# Patient Record
Sex: MALE | Race: BLACK OR AFRICAN AMERICAN | NOT HISPANIC OR LATINO | Employment: FULL TIME | URBAN - NONMETROPOLITAN AREA
[De-identification: names, ages, dates, MRNs, and addresses within clinical notes are randomized per-mention and may not be internally consistent; named-entity substitution may affect disease eponyms.]

---

## 2023-04-02 DIAGNOSIS — I10 BENIGN ESSENTIAL HTN: Primary | ICD-10-CM

## 2023-04-03 RX ORDER — LISINOPRIL 40 MG/1
TABLET ORAL
Qty: 90 TABLET | Refills: 1 | Status: SHIPPED | OUTPATIENT
Start: 2023-04-03 | End: 2023-10-09

## 2023-04-03 RX ORDER — HYDROCHLOROTHIAZIDE 12.5 MG/1
TABLET ORAL
Qty: 90 TABLET | Refills: 1 | Status: SHIPPED | OUTPATIENT
Start: 2023-04-03 | End: 2023-06-23 | Stop reason: SINTOL

## 2023-04-17 ENCOUNTER — TELEPHONE (OUTPATIENT)
Dept: PRIMARY CARE | Facility: CLINIC | Age: 63
End: 2023-04-17
Payer: COMMERCIAL

## 2023-04-17 NOTE — TELEPHONE ENCOUNTER
I received a reminder call about an appointment that I have tomorrow but the message got deleted and I do not have any idea where I am going.  Do you know?  I will be home the rest of the evening.

## 2023-04-18 LAB
APPEARANCE, URINE: CLEAR
BILIRUBIN, URINE: NEGATIVE
BLOOD, URINE: ABNORMAL
COLOR, URINE: YELLOW
GLUCOSE, URINE: NEGATIVE MG/DL
KETONES, URINE: NEGATIVE MG/DL
LEUKOCYTE ESTERASE, URINE: NEGATIVE
NITRITE, URINE: NEGATIVE
PH, URINE: 5 (ref 5–8)
PROTEIN, URINE: ABNORMAL MG/DL
RBC, URINE: NORMAL /HPF (ref 0–5)
SPECIFIC GRAVITY, URINE: 1.02 (ref 1–1.03)
SQUAMOUS EPITHELIAL CELLS, URINE: <1 /HPF
UROBILINOGEN, URINE: <2 MG/DL (ref 0–1.9)
WBC, URINE: 1 /HPF (ref 0–5)

## 2023-04-19 LAB
ALBUMIN (MG/L) IN URINE: 99.1 MG/L
ALBUMIN/CREATININE (UG/MG) IN URINE: 50.6 UG/MG CRT (ref 0–30)
CHLORIDE (MOL/L) IN SPOT URINE: 90 MMOL/L
CHLORIDE/CREATININE (MMOL/G) IN URINE: 46 MMOL/G CREAT (ref 23–275)
CREATININE (MG/DL) IN URINE: 196 MG/DL (ref 20–370)
OSMOLALITY, RANDOM URINE: 771 MOSM/KG (ref 200–1200)
POTASSIUM URINE RANDOM: 106 MMOL/L
POTASSIUM/CREATININE (MMOL/G) IN URINE: 54 MMOL/G CREAT
PROTEIN (MG/DL) IN URINE: 101 MG/DL (ref 5–25)
PROTEIN/CREATININE (MG/MG) IN URINE: 0.52 MG/MG CREAT (ref 0–0.17)
SODIUM URINE RANDOM: 56 MMOL/L
SODIUM/CREATININE (MMOL/G) IN URINE: 29 MMOL/G CREAT
UREA NITROGEN, RANDOM URINE: 1148 MG/DL
UREA NITROGEN/CREATININE (G/G) URINE: 5.9 G/G CREAT

## 2023-06-23 ENCOUNTER — OFFICE VISIT (OUTPATIENT)
Dept: PRIMARY CARE | Facility: CLINIC | Age: 63
End: 2023-06-23
Payer: COMMERCIAL

## 2023-06-23 VITALS
WEIGHT: 227 LBS | SYSTOLIC BLOOD PRESSURE: 126 MMHG | HEIGHT: 69 IN | BODY MASS INDEX: 33.62 KG/M2 | OXYGEN SATURATION: 99 % | DIASTOLIC BLOOD PRESSURE: 70 MMHG | HEART RATE: 68 BPM

## 2023-06-23 DIAGNOSIS — J45.20 MILD INTERMITTENT ASTHMA WITHOUT COMPLICATION (HHS-HCC): ICD-10-CM

## 2023-06-23 DIAGNOSIS — N18.32 CHRONIC RENAL IMPAIRMENT, STAGE 3B (MULTI): ICD-10-CM

## 2023-06-23 DIAGNOSIS — E78.5 DYSLIPIDEMIA: ICD-10-CM

## 2023-06-23 DIAGNOSIS — I10 BENIGN ESSENTIAL HYPERTENSION: ICD-10-CM

## 2023-06-23 DIAGNOSIS — Z00.00 WELLNESS EXAMINATION: ICD-10-CM

## 2023-06-23 DIAGNOSIS — Z23 IMMUNIZATION DUE: Primary | ICD-10-CM

## 2023-06-23 PROBLEM — R17 ELEVATED BILIRUBIN: Status: ACTIVE | Noted: 2023-06-23

## 2023-06-23 PROBLEM — F98.8 ATTENTION DEFICIT DISORDER PREDOMINANT INATTENTIVE TYPE: Status: ACTIVE | Noted: 2023-06-23

## 2023-06-23 PROBLEM — R79.82 CRP ELEVATED: Status: ACTIVE | Noted: 2023-06-23

## 2023-06-23 PROBLEM — G47.33 OBSTRUCTIVE SLEEP APNEA: Status: ACTIVE | Noted: 2023-06-23

## 2023-06-23 PROBLEM — N28.9 IMPAIRED RENAL FUNCTION: Status: ACTIVE | Noted: 2023-06-23

## 2023-06-23 PROBLEM — E78.00 ELEVATED LDL CHOLESTEROL LEVEL: Status: ACTIVE | Noted: 2023-06-23

## 2023-06-23 PROBLEM — J30.9 ALLERGIC RHINITIS: Status: ACTIVE | Noted: 2023-06-23

## 2023-06-23 LAB
ALBUMIN (G/DL) IN SER/PLAS: 4.4 G/DL (ref 3.4–5)
ANION GAP IN SER/PLAS: 14 MMOL/L (ref 10–20)
APPEARANCE, URINE: CLEAR
APPEARANCE, URINE: NORMAL
ASCORBIC ACID: NORMAL MG/DL
BASOPHILS (10*3/UL) IN BLOOD BY AUTOMATED COUNT: 0.02 X10E9/L (ref 0–0.1)
BASOPHILS/100 LEUKOCYTES IN BLOOD BY AUTOMATED COUNT: 0.5 % (ref 0–2)
BILIRUBIN, URINE: NEGATIVE
BILIRUBIN, URINE: NORMAL
BLOOD, URINE: NEGATIVE
BLOOD, URINE: NORMAL
CALCIUM (MG/DL) IN SER/PLAS: 9.2 MG/DL (ref 8.6–10.3)
CARBON DIOXIDE, TOTAL (MMOL/L) IN SER/PLAS: 27 MMOL/L (ref 21–32)
CHLORIDE (MMOL/L) IN SER/PLAS: 100 MMOL/L (ref 98–107)
COLOR, URINE: NORMAL
COLOR, URINE: YELLOW
CREATININE (MG/DL) IN SER/PLAS: 1.79 MG/DL (ref 0.5–1.3)
EOSINOPHILS (10*3/UL) IN BLOOD BY AUTOMATED COUNT: 0.3 X10E9/L (ref 0–0.7)
EOSINOPHILS/100 LEUKOCYTES IN BLOOD BY AUTOMATED COUNT: 7.2 % (ref 0–6)
ERYTHROCYTE DISTRIBUTION WIDTH (RATIO) BY AUTOMATED COUNT: 11.6 % (ref 11.5–14.5)
ERYTHROCYTE MEAN CORPUSCULAR HEMOGLOBIN CONCENTRATION (G/DL) BY AUTOMATED: 31.5 G/DL (ref 32–36)
ERYTHROCYTE MEAN CORPUSCULAR VOLUME (FL) BY AUTOMATED COUNT: 97 FL (ref 80–100)
ERYTHROCYTES (10*6/UL) IN BLOOD BY AUTOMATED COUNT: 4.39 X10E12/L (ref 4.5–5.9)
FERRITIN (UG/LL) IN SER/PLAS: 210 UG/L (ref 20–300)
GFR MALE: 42 ML/MIN/1.73M2
GLUCOSE (MG/DL) IN SER/PLAS: 92 MG/DL (ref 74–99)
GLUCOSE, URINE: ABNORMAL MG/DL
GLUCOSE, URINE: NORMAL
HEMATOCRIT (%) IN BLOOD BY AUTOMATED COUNT: 42.5 % (ref 41–52)
HEMOGLOBIN (G/DL) IN BLOOD: 13.4 G/DL (ref 13.5–17.5)
IMMATURE GRANULOCYTES/100 LEUKOCYTES IN BLOOD BY AUTOMATED COUNT: 0 % (ref 0–0.9)
IRON (UG/DL) IN SER/PLAS: 79 UG/DL (ref 35–150)
IRON BINDING CAPACITY (UG/DL) IN SER/PLAS: 313 UG/DL (ref 240–445)
IRON SATURATION (%) IN SER/PLAS: 25 % (ref 25–45)
KETONES, URINE: NEGATIVE MG/DL
KETONES, URINE: NORMAL
LEUKOCYTE ESTERASE, URINE: NEGATIVE
LEUKOCYTE ESTERASE, URINE: NORMAL
LEUKOCYTES (10*3/UL) IN BLOOD BY AUTOMATED COUNT: 4.2 X10E9/L (ref 4.4–11.3)
LYMPHOCYTES (10*3/UL) IN BLOOD BY AUTOMATED COUNT: 1.74 X10E9/L (ref 1.2–4.8)
LYMPHOCYTES/100 LEUKOCYTES IN BLOOD BY AUTOMATED COUNT: 41.6 % (ref 13–44)
MONOCYTES (10*3/UL) IN BLOOD BY AUTOMATED COUNT: 0.28 X10E9/L (ref 0.1–1)
MONOCYTES/100 LEUKOCYTES IN BLOOD BY AUTOMATED COUNT: 6.7 % (ref 2–10)
NEUTROPHILS (10*3/UL) IN BLOOD BY AUTOMATED COUNT: 1.84 X10E9/L (ref 1.2–7.7)
NEUTROPHILS/100 LEUKOCYTES IN BLOOD BY AUTOMATED COUNT: 44 % (ref 40–80)
NITRITE, URINE: NEGATIVE
NITRITE, URINE: NORMAL
PARATHYRIN INTACT (PG/ML) IN SER/PLAS: 86.4 PG/ML (ref 18.5–88)
PH, URINE: 6 (ref 5–8)
PH, URINE: NORMAL
PHOSPHATE (MG/DL) IN SER/PLAS: 3.9 MG/DL (ref 2.5–4.9)
PLATELETS (10*3/UL) IN BLOOD AUTOMATED COUNT: 260 X10E9/L (ref 150–450)
POTASSIUM (MMOL/L) IN SER/PLAS: 4.3 MMOL/L (ref 3.5–5.3)
PROTEIN, URINE: NEGATIVE MG/DL
PROTEIN, URINE: NORMAL
SODIUM (MMOL/L) IN SER/PLAS: 137 MMOL/L (ref 136–145)
SPECIFIC GRAVITY, URINE: 1.01 (ref 1–1.03)
SPECIFIC GRAVITY, URINE: NORMAL
URATE (MG/DL) IN SER/PLAS: 3.9 MG/DL (ref 4–7.5)
UREA NITROGEN (MG/DL) IN SER/PLAS: 24 MG/DL (ref 6–23)
UROBILINOGEN, URINE: <2 MG/DL (ref 0–1.9)
UROBILINOGEN, URINE: NORMAL

## 2023-06-23 PROCEDURE — 85025 COMPLETE CBC W/AUTO DIFF WBC: CPT

## 2023-06-23 PROCEDURE — 90471 IMMUNIZATION ADMIN: CPT | Performed by: FAMILY MEDICINE

## 2023-06-23 PROCEDURE — 99396 PREV VISIT EST AGE 40-64: CPT | Performed by: FAMILY MEDICINE

## 2023-06-23 PROCEDURE — 1036F TOBACCO NON-USER: CPT | Performed by: FAMILY MEDICINE

## 2023-06-23 PROCEDURE — 81003 URINALYSIS AUTO W/O SCOPE: CPT

## 2023-06-23 PROCEDURE — 83550 IRON BINDING TEST: CPT

## 2023-06-23 PROCEDURE — 99214 OFFICE O/P EST MOD 30 MIN: CPT | Performed by: FAMILY MEDICINE

## 2023-06-23 PROCEDURE — 3078F DIAST BP <80 MM HG: CPT | Performed by: FAMILY MEDICINE

## 2023-06-23 PROCEDURE — 82570 ASSAY OF URINE CREATININE: CPT

## 2023-06-23 PROCEDURE — 82728 ASSAY OF FERRITIN: CPT

## 2023-06-23 PROCEDURE — 80069 RENAL FUNCTION PANEL: CPT

## 2023-06-23 PROCEDURE — 82610 CYSTATIN C: CPT

## 2023-06-23 PROCEDURE — 90677 PCV20 VACCINE IM: CPT | Performed by: FAMILY MEDICINE

## 2023-06-23 PROCEDURE — 83540 ASSAY OF IRON: CPT

## 2023-06-23 PROCEDURE — 83036 HEMOGLOBIN GLYCOSYLATED A1C: CPT

## 2023-06-23 PROCEDURE — 83970 ASSAY OF PARATHORMONE: CPT

## 2023-06-23 PROCEDURE — 82306 VITAMIN D 25 HYDROXY: CPT

## 2023-06-23 PROCEDURE — 3074F SYST BP LT 130 MM HG: CPT | Performed by: FAMILY MEDICINE

## 2023-06-23 PROCEDURE — 84550 ASSAY OF BLOOD/URIC ACID: CPT

## 2023-06-23 PROCEDURE — 82043 UR ALBUMIN QUANTITATIVE: CPT

## 2023-06-23 RX ORDER — ROSUVASTATIN CALCIUM 20 MG/1
20 TABLET, COATED ORAL DAILY
COMMUNITY
End: 2023-08-12

## 2023-06-23 RX ORDER — AMLODIPINE BESYLATE 5 MG/1
5 TABLET ORAL DAILY
COMMUNITY
End: 2023-08-12

## 2023-06-23 RX ORDER — LISINOPRIL AND HYDROCHLOROTHIAZIDE 12.5; 2 MG/1; MG/1
2 TABLET ORAL DAILY
COMMUNITY
End: 2023-06-23 | Stop reason: SINTOL

## 2023-06-23 RX ORDER — TRIAMCINOLONE ACETONIDE 55 UG/1
SPRAY, METERED NASAL
COMMUNITY
Start: 2019-04-06

## 2023-06-23 RX ORDER — METOPROLOL SUCCINATE 100 MG/1
TABLET, EXTENDED RELEASE ORAL
COMMUNITY
End: 2023-08-12

## 2023-06-23 RX ORDER — DAPAGLIFLOZIN 10 MG/1
1 TABLET, FILM COATED ORAL
COMMUNITY
Start: 2023-04-18

## 2023-06-23 RX ORDER — ALBUTEROL SULFATE 90 UG/1
2 AEROSOL, METERED RESPIRATORY (INHALATION) EVERY 4 HOURS PRN
COMMUNITY
Start: 2013-06-05 | End: 2023-12-29 | Stop reason: SDUPTHER

## 2023-06-23 NOTE — PATIENT INSTRUCTIONS
Stop hydrochlorthiazide - the water pill  -   DR Franz wanted you to do this AND it may be keeping you up at night causing you to urinate more   MAKE SURE YOU HAVE THE APPT SET UP WITH HER       Check with your insurance on the Shingrix vaccine-  see if they cover it and where they want you to get it.   If our office - you can make a nurse appt for it -  let me know and I will place the order after you have an appt.   If the pharmacy - then check with the pharmacy to see if you need an appointment.    Its two injections -  2 - 6 months apart.         If you get a bad cut - make an appt to come in for a tetnas booster         WELL VISIT/PREVENTATIVE VISIT INSTRUCTIONS:        Call [phone] to schedule any testing ordered at [facility].      For a mammogram, call   [phone] .    Please work on healthy nutrition,  optimal sleep, and regular exercise to feel better.    If you smoke - please quit, and if you drink alcohol, please limit your intake.     Generally speaking,  good nutrition consists of lean meats, like chicken, fish and turkey (not fried);    plenty of fruits and vegetables (the fresher the better);   Less sugars, saturated fats, and processed foods - especially those made with white flour.   Try to eat the majority of your grain foods  as whole grains.   Keep an eye on your total calories in a day and serving sizes on packaging - this will help you limit your overall intake.    Remember, to lose weight (if you need to), your total calorie intake has to be about 300 - 500 calories less than what you burn in a day.   That number depends on your age, gender and body size.   Some people find a fitbit (calorie tracking device) helpful.      Please be sure to see the dentist every 6 months.    Please see the eye doctor no longer than every 2 years.    When you have your Living Will and Medical Power of  papers completed   (they have to be witnessed by 2 non-relatives or notarized to be  "legally binding),     please keep your originals in a safe place in your home, but make sure all your physicians have copies and that you take copies with you when you go to the hospital.        GETTING TEST RESULTS:    YOU WILL ALWAYS FIND OUT ABOUT ALL YOUR TEST RESULTS FROM ME.  I do not use the \"no news is good news\" policy.   The only time you would not, is if I have told you to get the testing done, and make a follow up appointment to go over it.    Then I will be waiting to talk to you at the visit about your test results.     I have a few different ways I get test results to you  (if its something urgent, we call you)  :       If you have a [employer] card -  I will have your test results on your secureach card within a week.  You should get a phone call telling you when the results are on the card, or just call the card in a week.   If two weeks go  by and you have not heard anything, call the card to see if the results are there,  and if not,  leave me a message.  If you are having trouble using [employer], they have a support line you should call :   [phone];   If you have lost your card, I need to know.     IT'S VERY IMPORTANT THAT YOU CALL TO LISTEN TO YOUR RESULTS, AS IT THEN LETS ME KNOW YOU GOT YOUR RESULTS.        If you get your test results on MY CHART,  you may commonly see your results even before I do.      I will always annotate a message on your results so you know that I saw them and how I feel about them.         IF I mail your results to you,   I need to hear from you if you do not receive them within 2 weeks.      Be sure to let me know your preference for getting results.         BILLING FOR PREVENTATIVE VISITS.     Most insurance companies pay for a yearly \"Wellness Check\"  or they may call it a \"Preventative Physical\"   - but it's important to know what your plan covers ahead of the visit.    It's also a good idea to find out what sort of preventative testing they will cover " "for screening tests - like cholesterol, blood sugar, or colonoscopies. Also, find out which vaccines are covered and if you have any responsibility in paying for them.       If at your Wellness Visit,  we cover anything to do with problems/issues  you are having or  chronic medications/ medical conditions you have,   there will ALSO be a regular office charge that day.     Blood work  or testing obtained that has anything to do with an acute issue or chronic condition is billed under that diagnosis and not screening.       It's a good idea to find out from your insurance what is covered and what is your responsibility for all of the above possible charges.       Most importantly,   if you ever have any questions or concerns that cannot wait until your next visit with me,  you can message me through MY CHART or call the office and leave a voice mail message  (please allow for at least 24 hours for responses for these messages).       Take good care.   Dr Lester          For General Healthy Nutrition    (Remember - NOT A DIET!   Diets are only good for class reunions.)    These are my general good nutrition recommendations for most people.   I use the term \" diet \"  in these instructions to mean your overall nutrition - how you eat and drink.   If we talked about something different during your visit with me,  other than what is written below,  follow that advice instead.       For most people,  eating healthier means getting less added sugar and less processed foods in your diet    The fresher the better.    Added sugar is now a part of the nutrition label on manufactured food, so you can keep an eye on it easier.    But basically,  foods and beverages  that contain regular sugar and corn syrup are the main sources of added sugars.  Eating as little of these foods as you can is best.   One shocking example of the epidemic of added sugar is soda.    One can of regular soda contains about as much added sugar as 3 regular " size doughnuts!     The other issue with processed foods is the amount of processed grains they contain , such as white flour.    This is also something you want to try to limit in your diet.     But, grain products are very important for your nutrition.    Whole grains are better for your body.     Cutting back on white breads, traditional pasta, baked goods, white rice,  and processed cereals will be healthier for you.   The better choices include whole grain breads,  whole wheat pasta,  brown rice, quinoa, barley, steel cut  or rolled oats.   If you eat cereal for breakfast, try to look for one made with whole grains and less sugar.   There are many people who have a problem with gluten, for a large variety of reasons.    Generally,  products made with wheat flour , barley or rye are the primary source of gluten.       Cutting back on saturated fats is important.    You want the majority of the meat that you eat to be chicken, fish or turkey.   Baked or broiled is best -  fried adds too much fat.    There are healthy fats that are important - fat is important for holding down appetite, vitamin absorption and several metabolic processes in the body.  Monounsaturated fats raise HDL (good cholesterol) and lower LDL (bad cholesterol).   Olive oil, peanut oil, nuts, seeds, and avocados are great sources of the good fats.       Ideas are:   Trade sour cream dip for hummus (which is rich in olive oil) or guacamole; use veggies or whole-wheat chips to dip.    Nuts are an excellent source of protein and healthy fats.   Tree nuts are the best kind, such as almonds or walnuts.   Just be careful - they are high in calories, so stick to a serving size.  (Most are about 200 calories for a 1/4 cup)      Proteins are very important for your body, and they also hold down your appetite.   Try to have protein with every meal.    These generally are meats, nuts, many beans, legumes, eggs, and dairy.   You will find protein in whole  "grain products and some green vegetables have a little too.     When you have dairy (if you can - many people are lactose intolerant) try to make it low fat.    Ideas are 1% milk, lowfat yogurt or cheeses, low fat cottage cheese.   I don't generally recommend FAT FREE because they often contain artificial products to improve taste, and the fat helps hold down your appetite.   If you are lactose intolerant, try to see if taking Lactaid before having dairy helps.      Fresh fruits and vegetables are VERY important.  The brighter the better.   Many vegetables are considered \"Free Foods\" - meaning you can eat as much as you want, and it does not matter.  These include tomatoes, cucumbers, celery, peppers, all the various lettuces and kale - to name a few.   Potatoes, corn and peas are starchy, so do have more calories, but are still healthy - you just want to watch the amount of them you eat.       Fruits are full of wonderful nutrition.   They contain natural sugar called fructose, so eating them in moderation is best.   Diabetics may need to pay careful attention to how their body reacts to the sugar.  Some fruits might drastically increase their blood sugar.      Eating smaller meals with a couple of small snacks is better for your metabolism than not eating for long amounts of time  (breakfast is very important).   Trying to avoid large meals is helpful too.    Eating like this helps keep your appetite down and keeps you in burning metabolism rather than storage metabolism so your body will use the calories you eat.       I do not tell people to stop eating sweets or snack foods - just limit the amounts you have.  The less the better.   Pay attention to serving sizes, and treat them as a treat.        Foods like doughnuts, pop tarts, sugar cereals, cookies  ARE NOT GOOD FOR BREAKFAST.   They are loaded with sugar and will cause you to be hungrier in the day and often not feel well.    Caffeine needs to be limited - " no more than 2 servings a day.  Some people can't have any at all.    (if you have any sleep or anxiety issues - stop the caffeine)   Coffee, many teas, many sodas, energy drinks, almost any diet supplement,  and chocolate all contain caffeine.      Water is important.   For most people, 8   x  8 ounces  a day are needed.  This may vary for some health issues.    If you need to be on a low sodium diet, that means looking at labels and eating only 1000 - 2000 mg of sodium a day.    Calcium intake is important.  3 servings of a high calcium food or drink a day is recommended.   This is usually a cup of milk, a cup of yogurt, an ounce of a hard cheese or 1.5 ounces of a soft cheese are the usual servings.   There are other high calcium foods - including soy or almond milk, broccoli,  almonds, dark green leafy vegetable.   Make sure you are not getting more than 1000  - 1200 mg of total calcium a day (unless you have been told you need more by a doctor).    Vitamin D 3 is important to absorb the calcium and for your immune system.   For children, 400 IU a day is recommended.   For adults - 800 - 5000 IU a day  is recommended.  (Often the amount needed is individualized for adults - be sure to ask how much is right for you)    Physical activity is very important for good health.    Finding activities that give you regular exercise is very important for good health.  Try to find exercise you enjoy doing on a regular basis.    30 minutes at least 5 days a week of a good cardiovascular exercise is recommended.   That means something that gets your heart rate going faster than your usual baseline and you can find yourself breathing harder than usual while you are exercising.  If you have not done any exercise in a long time,  make sure you ask if its safe for you to start,  and be sure to gradually work up to your goal.      If you need to lose weight,  following these recommendations will help you.   And if you are doing  all of this and still not losing weight, then its likely just the amount of food you are eating.   Learn to cut back on portion sizes.  Using smaller plates may help.  Healthy weight loss is  only about a pound a week.   You have to remember that whatever you do to lose the weight, you must be prepared to keep it up for life for the weight to stay off.     A lot of people have a lot of luck with using something like a fit bit,  or a program where you keep track of all of your calories that you eat and what you burn off in the day.       PLEASE PLAN YOUR NEXT APPOINTMENT WITH ME IN      6 MONTHS         ;   ALWAYS BE SURE TO CALL AT LEAST 6 WEEKS AHEAD OF WHEN YOU NEED THE APPOINTMENT TO BE SCHEDULED.      IF I HAVE TOLD YOU TO GET LABS AHEAD OF THE APPOINTMENT WITH ME TO GO OVER TOGETHER AT OUR APPOINTMENT,  BE SURE TO MAKE A LAB APPOINTMENT A FEW DAYS AHEAD OF YOUR VISIT WITH ME, AND LEAVE ME A MESSAGE CLOSE TO THE LAB APPT DATE TO REMIND ME TO PLACE ORDERS FOR THOSE LABS.      THANK YOU!

## 2023-06-23 NOTE — PROGRESS NOTES
"Subjective   Reason for Visit: Ian Hubbard is an 62 y.o. male here for a   ANNUAL CHECK UP AND FOLLOW UP MEDS     HPI    LOV   12/2022         INterval and Concerns:          Gen wellness today   and meds            At last appt  -  his sleep was much worse -   I thought perhaps the TRUMAN was worse again due to wt gain       WT/BMI last appt -   240 lb   WT today -  227    BMI  33    (accurate height today)       Today  -   He has been eating much better -   Biking again   Wt is down     Urinating every 2.5 hours at night   He is not on a prostate medication   Last PSA a year ago  - less than one       Saw Nephrologist in April   Needs labs  done -    Had Renal US   Farxiga started  She mentioned in her note for him to stop the hydrochlorothiazide - he continued it  (was confused)      Chronic issues reviewed today:      HTN - Amlodipine 5 mg a day , lisinopril  40 mg  and - Hctz  12.5 mg a day, Metoprolol  mg a day -   Not checking BPS      mild CRI -   Has been seeing Nephrology   She started Farxiga 10 mg a day      Hyperchol - Rosuvastatin 20 mg a day - tolerating well      Never went for Cardiac CT - does not want yet      Exercise - better - biking more      Asthma - doing well off inhalers ; just has albuterol to use prn      Dr Walton did colonoscopy 2015 - 2 small polyps - \"hyperplastic\" - good 5 years.   Did cologuard -  1/22/23 -  NORMAL      TRUMAN - not on CPAP  SINCE SINUS SURGERY      Flu shot - UTD   Pneumovax in 2012 .  No Prevnar 20 yet   Did not check into Shingrix yet   COVID: 2021 x 2 done Pfizer and boosters - last one in SEPT   Tetnas/pertussis  -  2008      He is working - living alone. He did move into a 2 bedroom apt.   Son living in [facility] - working in a factory      Into music  - plays several instruments      DTRs in [place] -  hoping they will come to see him              Patient Care Team:  Laquita Mercedes MD as PCP - General     Review of Systems    Objective " "  Vitals:  /70 (BP Location: Right arm, Patient Position: Sitting, BP Cuff Size: Large adult)   Pulse 68   Ht 1.753 m (5' 9\")   Wt 103 kg (227 lb)   SpO2 99%   BMI 33.52 kg/m²       Physical Exam  Vitals and nursing note reviewed.   Constitutional:       General: He is not in acute distress.     Appearance: Normal appearance. He is not ill-appearing, toxic-appearing or diaphoretic.   HENT:      Head: Normocephalic and atraumatic.      Right Ear: Tympanic membrane, ear canal and external ear normal.      Left Ear: Tympanic membrane, ear canal and external ear normal.      Nose: Nose normal.      Mouth/Throat:      Mouth: Mucous membranes are moist.      Pharynx: No posterior oropharyngeal erythema.   Eyes:      General: No scleral icterus.     Extraocular Movements: Extraocular movements intact.      Pupils: Pupils are equal, round, and reactive to light.   Cardiovascular:      Rate and Rhythm: Normal rate and regular rhythm.      Pulses: Normal pulses.      Heart sounds: No murmur heard.  Pulmonary:      Effort: Pulmonary effort is normal. No respiratory distress.      Breath sounds: Normal breath sounds. No wheezing.   Abdominal:      General: Bowel sounds are normal. There is no distension.      Palpations: Abdomen is soft.      Tenderness: There is no abdominal tenderness.   Musculoskeletal:         General: Normal range of motion.      Cervical back: Neck supple. No rigidity.      Right lower leg: No edema.      Left lower leg: No edema.   Lymphadenopathy:      Cervical: No cervical adenopathy.   Skin:     General: Skin is warm.      Findings: No rash.   Neurological:      General: No focal deficit present.      Mental Status: He is alert and oriented to person, place, and time.   Psychiatric:         Mood and Affect: Mood normal.         Thought Content: Thought content normal.         Assessment/Plan   Problem List Items Addressed This Visit          High    Benign essential hypertension    Chronic " renal impairment, stage 3b    Relevant Orders    Albumin , Urine Random (Completed)    Cystatin C    Ferritin (Completed)    Hemoglobin A1C (Completed)    Iron and TIBC (Completed)    PTH, intact (Completed)    Renal function panel (Completed)    Uric Acid (Completed)    Urinalysis with Reflex Microscopic (Completed)    Vitamin D, Total (Completed)    CBC and Auto Differential (Completed)    Dyslipidemia    Mild intermittent asthma without complication     Other Visit Diagnoses       Immunization due    -  Primary    Relevant Orders    Pneumococcal conjugate vaccine, 20-valent, adult (PREVNAR 20) (Completed)    Wellness examination              Gen wellness today with med check     Primary issues at this time -   Up at night urinating  -   Sleep is better with wt loss -     Nephrology wanted him off hydrochlorothiazide - so lets try that first -   I did obtain the labs for her today  -   Told him to be sure he has appt scheduled with her     I had a  discussion  with patient  about their elevated BMI   and/ or  provided information  on how to improve it with better nutrition and exercise.      We discussed at visit any disease processes that were of concern as well as the risks, benefits and instructions of any new medication provided.    See orders and discussion section for information handed to patient on their Clinical Summary.   Patient (and/or caretaker of patient if present)  stated all questions were answered, and they voiced understanding of instructions.

## 2023-06-24 LAB
ALBUMIN (MG/L) IN URINE: 20.3 MG/L
ALBUMIN (MG/L) IN URINE: 21.2 MG/L
ALBUMIN/CREATININE (UG/MG) IN URINE: 32.4 UG/MG CRT (ref 0–30)
ALBUMIN/CREATININE (UG/MG) IN URINE: 33.6 UG/MG CRT (ref 0–30)
CALCIDIOL (25 OH VITAMIN D3) (NG/ML) IN SER/PLAS: 30 NG/ML
CREATININE (MG/DL) IN URINE: 62.6 MG/DL (ref 20–370)
CREATININE (MG/DL) IN URINE: 63.1 MG/DL (ref 20–370)
ERYTHROCYTE DISTRIBUTION WIDTH (RATIO) BY AUTOMATED COUNT: NORMAL
ERYTHROCYTE MEAN CORPUSCULAR HEMOGLOBIN CONCENTRATION (G/DL) BY AUTOMATED: NORMAL
ERYTHROCYTE MEAN CORPUSCULAR VOLUME (FL) BY AUTOMATED COUNT: NORMAL
ERYTHROCYTES (10*6/UL) IN BLOOD BY AUTOMATED COUNT: NORMAL
ESTIMATED AVERAGE GLUCOSE FOR HBA1C: 91 MG/DL
ESTIMATED AVERAGE GLUCOSE FOR HBA1C: NORMAL
HEMATOCRIT (%) IN BLOOD BY AUTOMATED COUNT: NORMAL
HEMOGLOBIN (G/DL) IN BLOOD: NORMAL
HEMOGLOBIN A1C/HEMOGLOBIN TOTAL IN BLOOD: 4.8 %
HEMOGLOBIN A1C/HEMOGLOBIN TOTAL IN BLOOD: NORMAL
HGB A1C-DATA CONVERSION: NORMAL %
LEUKOCYTES (10*3/UL) IN BLOOD BY AUTOMATED COUNT: NORMAL
NRBC (PER 100 WBCS) BY AUTOMATED COUNT: NORMAL
PLATELETS (10*3/UL) IN BLOOD AUTOMATED COUNT: NORMAL

## 2023-06-25 LAB
ALBUMIN (G/DL) IN SER/PLAS: NORMAL
ANION GAP IN SER/PLAS: NORMAL
CALCIDIOL (25 OH VITAMIN D3) (NG/ML) IN SER/PLAS: NORMAL
CALCIUM (MG/DL) IN SER/PLAS: NORMAL
CARBON DIOXIDE, TOTAL (MMOL/L) IN SER/PLAS: NORMAL
CHLORIDE (MMOL/L) IN SER/PLAS: NORMAL
CREATININE (MG/DL) IN SER/PLAS: NORMAL
FERRITIN (UG/LL) IN SER/PLAS: NORMAL
GFR FEMALE: NORMAL
GFR MALE: NORMAL
GLUCOSE (MG/DL) IN SER/PLAS: NORMAL
IRON (UG/DL) IN SER/PLAS: NORMAL
IRON BINDING CAPACITY (UG/DL) IN SER/PLAS: NORMAL
IRON SATURATION (%) IN SER/PLAS: NORMAL
PARATHYRIN INTACT (PG/ML) IN SER/PLAS: NORMAL
PHOSPHATE (MG/DL) IN SER/PLAS: NORMAL
POTASSIUM (MMOL/L) IN SER/PLAS: NORMAL
SODIUM (MMOL/L) IN SER/PLAS: NORMAL
URATE (MG/DL) IN SER/PLAS: NORMAL
UREA NITROGEN (MG/DL) IN SER/PLAS: NORMAL

## 2023-06-27 LAB
CYSTATIN C: 1.2 MG/L (ref 0.67–1.21)
CYSTATIN C: 1.21 MG/L (ref 0.67–1.21)
EGFR BY CYSTATIN C: 60 ML/MIN/BSA
EGFR BY CYSTATIN C: 60 ML/MIN/BSA

## 2023-08-12 DIAGNOSIS — I10 BENIGN ESSENTIAL HYPERTENSION: Primary | ICD-10-CM

## 2023-08-12 DIAGNOSIS — E78.5 DYSLIPIDEMIA: ICD-10-CM

## 2023-08-12 RX ORDER — METOPROLOL SUCCINATE 100 MG/1
TABLET, EXTENDED RELEASE ORAL
Qty: 90 TABLET | Refills: 1 | Status: SHIPPED | OUTPATIENT
Start: 2023-08-12 | End: 2024-02-10

## 2023-08-12 RX ORDER — ROSUVASTATIN CALCIUM 20 MG/1
20 TABLET, COATED ORAL DAILY
Qty: 90 TABLET | Refills: 1 | Status: SHIPPED | OUTPATIENT
Start: 2023-08-12 | End: 2024-02-10

## 2023-08-12 RX ORDER — AMLODIPINE BESYLATE 5 MG/1
5 TABLET ORAL DAILY
Qty: 90 TABLET | Refills: 1 | Status: SHIPPED | OUTPATIENT
Start: 2023-08-12

## 2023-10-08 DIAGNOSIS — I10 BENIGN ESSENTIAL HTN: ICD-10-CM

## 2023-10-09 RX ORDER — LISINOPRIL 40 MG/1
TABLET ORAL
Qty: 90 TABLET | Refills: 0 | Status: SHIPPED | OUTPATIENT
Start: 2023-10-09 | End: 2024-01-06 | Stop reason: SDUPTHER

## 2023-12-29 ENCOUNTER — OFFICE VISIT (OUTPATIENT)
Dept: PRIMARY CARE | Facility: CLINIC | Age: 63
End: 2023-12-29
Payer: COMMERCIAL

## 2023-12-29 VITALS
BODY MASS INDEX: 32.88 KG/M2 | WEIGHT: 222 LBS | HEIGHT: 69 IN | DIASTOLIC BLOOD PRESSURE: 66 MMHG | HEART RATE: 64 BPM | OXYGEN SATURATION: 96 % | SYSTOLIC BLOOD PRESSURE: 118 MMHG

## 2023-12-29 DIAGNOSIS — M25.541 PAIN IN JOINT OF RIGHT HAND: ICD-10-CM

## 2023-12-29 DIAGNOSIS — N18.32 CHRONIC RENAL IMPAIRMENT, STAGE 3B (MULTI): ICD-10-CM

## 2023-12-29 DIAGNOSIS — I10 BENIGN ESSENTIAL HYPERTENSION: ICD-10-CM

## 2023-12-29 DIAGNOSIS — J45.20 MILD INTERMITTENT ASTHMA WITHOUT COMPLICATION (HHS-HCC): Primary | ICD-10-CM

## 2023-12-29 DIAGNOSIS — E78.5 DYSLIPIDEMIA: ICD-10-CM

## 2023-12-29 LAB
ALBUMIN SERPL BCP-MCNC: 4.6 G/DL (ref 3.4–5)
ANION GAP SERPL CALC-SCNC: 16 MMOL/L (ref 10–20)
BASOPHILS # BLD AUTO: 0.02 X10*3/UL (ref 0–0.1)
BASOPHILS NFR BLD AUTO: 0.4 %
BUN SERPL-MCNC: 25 MG/DL (ref 6–23)
CALCIUM SERPL-MCNC: 9.4 MG/DL (ref 8.6–10.3)
CHLORIDE SERPL-SCNC: 103 MMOL/L (ref 98–107)
CO2 SERPL-SCNC: 24 MMOL/L (ref 21–32)
CREAT SERPL-MCNC: 1.67 MG/DL (ref 0.5–1.3)
EOSINOPHIL # BLD AUTO: 0.2 X10*3/UL (ref 0–0.7)
EOSINOPHIL NFR BLD AUTO: 4 %
ERYTHROCYTE [DISTWIDTH] IN BLOOD BY AUTOMATED COUNT: 11.6 % (ref 11.5–14.5)
GFR SERPL CREATININE-BSD FRML MDRD: 45 ML/MIN/1.73M*2
GLUCOSE SERPL-MCNC: 98 MG/DL (ref 74–99)
HCT VFR BLD AUTO: 46 % (ref 41–52)
HGB BLD-MCNC: 14.6 G/DL (ref 13.5–17.5)
IMM GRANULOCYTES # BLD AUTO: 0.01 X10*3/UL (ref 0–0.7)
IMM GRANULOCYTES NFR BLD AUTO: 0.2 % (ref 0–0.9)
LYMPHOCYTES # BLD AUTO: 1.82 X10*3/UL (ref 1.2–4.8)
LYMPHOCYTES NFR BLD AUTO: 36.5 %
MCH RBC QN AUTO: 31.1 PG (ref 26–34)
MCHC RBC AUTO-ENTMCNC: 31.7 G/DL (ref 32–36)
MCV RBC AUTO: 98 FL (ref 80–100)
MONOCYTES # BLD AUTO: 0.34 X10*3/UL (ref 0.1–1)
MONOCYTES NFR BLD AUTO: 6.8 %
NEUTROPHILS # BLD AUTO: 2.6 X10*3/UL (ref 1.2–7.7)
NEUTROPHILS NFR BLD AUTO: 52.1 %
NRBC BLD-RTO: 0 /100 WBCS (ref 0–0)
PHOSPHATE SERPL-MCNC: 3.6 MG/DL (ref 2.5–4.9)
PLATELET # BLD AUTO: 220 X10*3/UL (ref 150–450)
POTASSIUM SERPL-SCNC: 4.5 MMOL/L (ref 3.5–5.3)
RBC # BLD AUTO: 4.69 X10*6/UL (ref 4.5–5.9)
SODIUM SERPL-SCNC: 138 MMOL/L (ref 136–145)
WBC # BLD AUTO: 5 X10*3/UL (ref 4.4–11.3)

## 2023-12-29 PROCEDURE — 99214 OFFICE O/P EST MOD 30 MIN: CPT | Performed by: FAMILY MEDICINE

## 2023-12-29 PROCEDURE — 3074F SYST BP LT 130 MM HG: CPT | Performed by: FAMILY MEDICINE

## 2023-12-29 PROCEDURE — 1036F TOBACCO NON-USER: CPT | Performed by: FAMILY MEDICINE

## 2023-12-29 PROCEDURE — 85025 COMPLETE CBC W/AUTO DIFF WBC: CPT

## 2023-12-29 PROCEDURE — 80069 RENAL FUNCTION PANEL: CPT

## 2023-12-29 PROCEDURE — 3078F DIAST BP <80 MM HG: CPT | Performed by: FAMILY MEDICINE

## 2023-12-29 PROCEDURE — 36415 COLL VENOUS BLD VENIPUNCTURE: CPT

## 2023-12-29 RX ORDER — ALBUTEROL SULFATE 90 UG/1
2 AEROSOL, METERED RESPIRATORY (INHALATION) EVERY 4 HOURS PRN
Qty: 18 G | Refills: 2 | Status: SHIPPED | OUTPATIENT
Start: 2023-12-29
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
## 2023-12-29 NOTE — PATIENT INSTRUCTIONS
You will need to check with your insurance to see where they want you to get your vaccines    Check with your insurance on the Shingrix vaccine-  see if they cover it and where they want you to get it.   If our office - you can make a nurse appt for it -  let me know and I will place the order after you have an appt.   If the pharmacy - then check with the pharmacy to see if you need an appointment.    Its two injections -  2 - 6 months apart.         Also check on the RSV vaccine         Keep up the great work with exercise and keeping the weight down     For the hand -   Get a squishy ball to work it often [    If swelling and pain starts again  - I need to see you - call the office for a same day appt       Hypertension Reminders:    IF YOU ARE A PERSON WHOSE BLOOD PRESSURE RUNS HIGH IN THE DOCTOR'S OFFICE,  THEN WE NEED TO VERIFY YOUR CUFF AT LEAST  ONCE YEARLY.   ALWAYS BRING CUFF WITH YOU TO ANY HYPERTENSION CHECK UP APPOINTMENT.  WE CAN RECORD YOUR BP  FROM HOME THE DAY OF THE APPOINTMENT - BUT WE HAVE TO SEE IT ON YOUR MACHINE.      To accurately check your blood pressure -  be sure to sit and relax for 5 minutes, you need your back supported, feet flat on the ground, arm heart level and relaxed.    Generally speaking, well controlled hypertension is below 130/80   for  most people  and if you are over 75, below 140/90  is acceptable.    Please take your medication as directed, and if you forget a dose  DO NOT DOUBLE THE DOSE THE NEXT DAY, just take is as you normally would.     It is important to stay on a low sodium diet :  1500 - 2000 mg of sodium a day  -  it is important to read labels.    Regular Exercise is very important as well.  Always gradually increase your exercise regimen.  Your goal is 30 minutes of a good cardiovascular exercise at least 5 days a week.    IF YOUR BMI (BODY MASS INDEX) IS OVER 25, LOSING WEIGHT WILL HELP CONTROL YOUR BLOOD PRESSURE.   Talk to me further if you need help doing  "this.        It is very important NOT to smoke  or use any tobacco products.  Talk to me about options if you want help quitting.    It is very important to keep your alcohol in take low.   Generally speaking, adult men should not drink more than 2 regular size beers a day, or no more than 2 ounces of liquor, or no more than 12 ounces of wine.  For adult women - the recommendations are half that.    BUT , THIS IS NOT UNIVERSAL   - be sure to ask me if alcohol is safe for you to drink, and if so, the acceptable amount.            For General Healthy Nutrition    (Remember - NOT A DIET!   Diets are only good for class reunions.)    These are my general good nutrition recommendations for most people.   I use the term \" diet \"  in these instructions to mean your overall nutrition - how you eat and drink.   If we talked about something different during your visit with me,  other than what is written below,  follow that advice instead.       For most people,  eating healthier means getting less added sugar and less processed foods in your diet    The fresher the better.    Added sugar is now a part of the nutrition label on manufactured food, so you can keep an eye on it easier.    But basically,  foods and beverages  that contain regular sugar and corn syrup are the main sources of added sugars.  Eating as little of these foods as you can is best.   One shocking example of the epidemic of added sugar is soda.    One can of regular soda contains about as much added sugar as 3 regular size doughnuts!     The other issue with processed foods is the amount of processed grains they contain , such as white flour.    This is also something you want to try to limit in your diet.     But, grain products are very important for your nutrition.    Whole grains are better for your body.     Cutting back on white breads, traditional pasta, baked goods, white rice,  and processed cereals will be healthier for you.   The better choices " include whole grain breads,  whole wheat pasta,  brown rice, quinoa, barley, steel cut  or rolled oats.   If you eat cereal for breakfast, try to look for one made with whole grains and less sugar.   There are many people who have a problem with gluten, for a large variety of reasons.    Generally,  products made with wheat flour , barley or rye are the primary source of gluten.       Cutting back on saturated fats is important.    You want the majority of the meat that you eat to be chicken, fish or turkey.   Baked or broiled is best -  fried adds too much fat.    There are healthy fats that are important - fat is important for holding down appetite, vitamin absorption and several metabolic processes in the body.  Monounsaturated fats raise HDL (good cholesterol) and lower LDL (bad cholesterol).   Olive oil, peanut oil, nuts, seeds, and avocados are great sources of the good fats.       Ideas are:   Trade sour cream dip for hummus (which is rich in olive oil) or guacamole; use veggies or whole-wheat chips to dip.    Nuts are an excellent source of protein and healthy fats.   Tree nuts are the best kind, such as almonds or walnuts.   Just be careful - they are high in calories, so stick to a serving size.  (Most are about 200 calories for a 1/4 cup)      Proteins are very important for your body, and they also hold down your appetite.   Try to have protein with every meal.    These generally are meats, nuts, many beans, legumes, eggs, and dairy.   You will find protein in whole grain products and some green vegetables have a little too.     When you have dairy (if you can - many people are lactose intolerant) try to make it low fat.    Ideas are 1% milk, lowfat yogurt or cheeses, low fat cottage cheese.   I don't generally recommend FAT FREE because they often contain artificial products to improve taste, and the fat helps hold down your appetite.   If you are lactose intolerant, try to see if taking Lactaid before  "having dairy helps.      Fresh fruits and vegetables are VERY important.  The brighter the better.   Many vegetables are considered \"Free Foods\" - meaning you can eat as much as you want, and it does not matter.  These include tomatoes, cucumbers, celery, peppers, all the various lettuces and kale - to name a few.   Potatoes, corn and peas are starchy, so do have more calories, but are still healthy - you just want to watch the amount of them you eat.       Fruits are full of wonderful nutrition.   They contain natural sugar called fructose, so eating them in moderation is best.   Diabetics may need to pay careful attention to how their body reacts to the sugar.  Some fruits might drastically increase their blood sugar.      Eating smaller meals with a couple of small snacks is better for your metabolism than not eating for long amounts of time  (breakfast is very important).   Trying to avoid large meals is helpful too.    Eating like this helps keep your appetite down and keeps you in burning metabolism rather than storage metabolism so your body will use the calories you eat.       I do not tell people to stop eating sweets or snack foods - just limit the amounts you have.  The less the better.   Pay attention to serving sizes, and treat them as a treat.        Foods like doughnuts, pop tarts, sugar cereals, cookies  ARE NOT GOOD FOR BREAKFAST.   They are loaded with sugar and will cause you to be hungrier in the day and often not feel well.    Caffeine needs to be limited - no more than 2 servings a day.  Some people can't have any at all.    (if you have any sleep or anxiety issues - stop the caffeine)   Coffee, many teas, many sodas, energy drinks, almost any diet supplement,  and chocolate all contain caffeine.      Water is important.   For most people, 8   x  8 ounces  a day are needed.  This may vary for some health issues.    If you need to be on a low sodium diet, that means looking at labels and eating " only 1000 - 2000 mg of sodium a day.    Calcium intake is important.  3 servings of a high calcium food or drink a day is recommended.   This is usually a cup of milk, a cup of yogurt, an ounce of a hard cheese or 1.5 ounces of a soft cheese are the usual servings.   There are other high calcium foods - including soy or almond milk, broccoli,  almonds, dark green leafy vegetable.   Make sure you are not getting more than 1000  - 1200 mg of total calcium a day (unless you have been told you need more by a doctor).    Vitamin D 3 is important to absorb the calcium and for your immune system.   For children, 400 IU a day is recommended.   For adults - 800 - 5000 IU a day  is recommended.  (Often the amount needed is individualized for adults - be sure to ask how much is right for you)    Physical activity is very important for good health.    Finding activities that give you regular exercise is very important for good health.  Try to find exercise you enjoy doing on a regular basis.    30 minutes at least 5 days a week of a good cardiovascular exercise is recommended.   That means something that gets your heart rate going faster than your usual baseline and you can find yourself breathing harder than usual while you are exercising.  If you have not done any exercise in a long time,  make sure you ask if its safe for you to start,  and be sure to gradually work up to your goal.      If you need to lose weight,  following these recommendations will help you.   And if you are doing all of this and still not losing weight, then its likely just the amount of food you are eating.   Learn to cut back on portion sizes.  Using smaller plates may help.  Healthy weight loss is  only about a pound a week.   You have to remember that whatever you do to lose the weight, you must be prepared to keep it up for life for the weight to stay off.     A lot of people have a lot of luck with using something like a fit bit,  or a program  where you keep track of all of your calories that you eat and what you burn off in the day.

## 2024-01-06 DIAGNOSIS — I10 BENIGN ESSENTIAL HTN: ICD-10-CM

## 2024-01-06 RX ORDER — LISINOPRIL 40 MG/1
TABLET ORAL
Qty: 90 TABLET | Refills: 1 | Status: SHIPPED | OUTPATIENT
Start: 2024-01-06

## 2024-02-10 DIAGNOSIS — E78.5 DYSLIPIDEMIA: ICD-10-CM

## 2024-02-10 DIAGNOSIS — I10 BENIGN ESSENTIAL HYPERTENSION: ICD-10-CM

## 2024-02-10 RX ORDER — ROSUVASTATIN CALCIUM 20 MG/1
20 TABLET, COATED ORAL DAILY
Qty: 90 TABLET | Refills: 1 | Status: SHIPPED | OUTPATIENT
Start: 2024-02-10

## 2024-02-10 RX ORDER — METOPROLOL SUCCINATE 100 MG/1
100 TABLET, EXTENDED RELEASE ORAL DAILY
Qty: 90 TABLET | Refills: 1 | Status: SHIPPED | OUTPATIENT
Start: 2024-02-10

## 2024-06-21 ENCOUNTER — APPOINTMENT (OUTPATIENT)
Dept: PRIMARY CARE | Facility: CLINIC | Age: 64
End: 2024-06-21

## 2024-06-21 VITALS
DIASTOLIC BLOOD PRESSURE: 70 MMHG | HEART RATE: 68 BPM | OXYGEN SATURATION: 96 % | SYSTOLIC BLOOD PRESSURE: 122 MMHG | BODY MASS INDEX: 33.08 KG/M2 | WEIGHT: 224 LBS

## 2024-06-21 DIAGNOSIS — J45.20 MILD INTERMITTENT ASTHMA WITHOUT COMPLICATION (HHS-HCC): ICD-10-CM

## 2024-06-21 DIAGNOSIS — I10 BENIGN ESSENTIAL HTN: ICD-10-CM

## 2024-06-21 DIAGNOSIS — E78.5 DYSLIPIDEMIA: ICD-10-CM

## 2024-06-21 DIAGNOSIS — Z00.00 WELLNESS EXAMINATION: Primary | ICD-10-CM

## 2024-06-21 DIAGNOSIS — Z12.5 SCREENING FOR PROSTATE CANCER: ICD-10-CM

## 2024-06-21 LAB
ALBUMIN SERPL BCP-MCNC: 4.3 G/DL (ref 3.4–5)
ALP SERPL-CCNC: 45 U/L (ref 33–136)
ALT SERPL W P-5'-P-CCNC: 28 U/L (ref 10–52)
ANION GAP SERPL CALC-SCNC: 14 MMOL/L (ref 10–20)
AST SERPL W P-5'-P-CCNC: 24 U/L (ref 9–39)
BASOPHILS # BLD AUTO: 0.03 X10*3/UL (ref 0–0.1)
BASOPHILS NFR BLD AUTO: 0.7 %
BILIRUB SERPL-MCNC: 0.7 MG/DL (ref 0–1.2)
BUN SERPL-MCNC: 20 MG/DL (ref 6–23)
CALCIUM SERPL-MCNC: 9.4 MG/DL (ref 8.6–10.3)
CHLORIDE SERPL-SCNC: 104 MMOL/L (ref 98–107)
CHOLEST SERPL-MCNC: 208 MG/DL (ref 0–199)
CHOLESTEROL/HDL RATIO: 5.1
CO2 SERPL-SCNC: 24 MMOL/L (ref 21–32)
CREAT SERPL-MCNC: 1.71 MG/DL (ref 0.5–1.3)
EGFRCR SERPLBLD CKD-EPI 2021: 44 ML/MIN/1.73M*2
EOSINOPHIL # BLD AUTO: 0.39 X10*3/UL (ref 0–0.7)
EOSINOPHIL NFR BLD AUTO: 9 %
ERYTHROCYTE [DISTWIDTH] IN BLOOD BY AUTOMATED COUNT: 11.8 % (ref 11.5–14.5)
GLUCOSE SERPL-MCNC: 97 MG/DL (ref 74–99)
HCT VFR BLD AUTO: 43.9 % (ref 41–52)
HDLC SERPL-MCNC: 41 MG/DL
HGB BLD-MCNC: 13.4 G/DL (ref 13.5–17.5)
IMM GRANULOCYTES # BLD AUTO: 0.01 X10*3/UL (ref 0–0.7)
IMM GRANULOCYTES NFR BLD AUTO: 0.2 % (ref 0–0.9)
LDLC SERPL CALC-MCNC: 129 MG/DL
LYMPHOCYTES # BLD AUTO: 1.66 X10*3/UL (ref 1.2–4.8)
LYMPHOCYTES NFR BLD AUTO: 38.3 %
MCH RBC QN AUTO: 30.7 PG (ref 26–34)
MCHC RBC AUTO-ENTMCNC: 30.5 G/DL (ref 32–36)
MCV RBC AUTO: 101 FL (ref 80–100)
MONOCYTES # BLD AUTO: 0.32 X10*3/UL (ref 0.1–1)
MONOCYTES NFR BLD AUTO: 7.4 %
NEUTROPHILS # BLD AUTO: 1.92 X10*3/UL (ref 1.2–7.7)
NEUTROPHILS NFR BLD AUTO: 44.4 %
NON HDL CHOLESTEROL: 167 MG/DL (ref 0–149)
NRBC BLD-RTO: 0 /100 WBCS (ref 0–0)
PLATELET # BLD AUTO: 211 X10*3/UL (ref 150–450)
POTASSIUM SERPL-SCNC: 4.3 MMOL/L (ref 3.5–5.3)
PROT SERPL-MCNC: 6.6 G/DL (ref 6.4–8.2)
PSA SERPL-MCNC: 2.34 NG/ML
RBC # BLD AUTO: 4.36 X10*6/UL (ref 4.5–5.9)
SODIUM SERPL-SCNC: 138 MMOL/L (ref 136–145)
TRIGL SERPL-MCNC: 192 MG/DL (ref 0–149)
TSH SERPL-ACNC: 2.9 MIU/L (ref 0.44–3.98)
VLDL: 38 MG/DL (ref 0–40)
WBC # BLD AUTO: 4.3 X10*3/UL (ref 4.4–11.3)

## 2024-06-21 PROCEDURE — 80061 LIPID PANEL: CPT

## 2024-06-21 PROCEDURE — 85025 COMPLETE CBC W/AUTO DIFF WBC: CPT

## 2024-06-21 PROCEDURE — 1036F TOBACCO NON-USER: CPT | Performed by: FAMILY MEDICINE

## 2024-06-21 PROCEDURE — 99396 PREV VISIT EST AGE 40-64: CPT | Performed by: FAMILY MEDICINE

## 2024-06-21 PROCEDURE — 84443 ASSAY THYROID STIM HORMONE: CPT

## 2024-06-21 PROCEDURE — 36415 COLL VENOUS BLD VENIPUNCTURE: CPT

## 2024-06-21 PROCEDURE — 84153 ASSAY OF PSA TOTAL: CPT

## 2024-06-21 PROCEDURE — 99213 OFFICE O/P EST LOW 20 MIN: CPT | Performed by: FAMILY MEDICINE

## 2024-06-21 PROCEDURE — 80053 COMPREHEN METABOLIC PANEL: CPT

## 2024-06-21 PROCEDURE — 3078F DIAST BP <80 MM HG: CPT | Performed by: FAMILY MEDICINE

## 2024-06-21 PROCEDURE — 3074F SYST BP LT 130 MM HG: CPT | Performed by: FAMILY MEDICINE

## 2024-06-21 RX ORDER — METOPROLOL SUCCINATE 100 MG/1
100 TABLET, EXTENDED RELEASE ORAL DAILY
Qty: 90 TABLET | Refills: 1 | Status: SHIPPED | OUTPATIENT
Start: 2024-06-21

## 2024-06-21 RX ORDER — ROSUVASTATIN CALCIUM 20 MG/1
20 TABLET, COATED ORAL DAILY
Qty: 90 TABLET | Refills: 1 | Status: SHIPPED | OUTPATIENT
Start: 2024-06-21

## 2024-06-21 RX ORDER — ALBUTEROL SULFATE 90 UG/1
2 AEROSOL, METERED RESPIRATORY (INHALATION) EVERY 4 HOURS PRN
Qty: 18 G | Refills: 2 | Status: SHIPPED | OUTPATIENT
Start: 2024-06-21

## 2024-06-21 RX ORDER — LISINOPRIL 40 MG/1
40 TABLET ORAL DAILY
Qty: 90 TABLET | Refills: 1 | Status: SHIPPED | OUTPATIENT
Start: 2024-06-21
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
## 2024-06-21 NOTE — PATIENT INSTRUCTIONS
For allergy symptoms  -   You can try the over the counter long acting antihistamines :   Claritin  ( loratadine)  or Allegra  (fexofenadine) or Zyrtec (cetirizine) or Xyzal  (levo-cetirizine).   You could also try the steroid nasal sprays:   Fluticasone (Flonase),  Nasacort, or Rhinocort.  Be sure to use them as directed.   If you  do use them,  after you insert them into your nose,   tilt outward toward your eye to avoid nose bleeds.   You may need to stay on these through your allergy season.   You might need only one of these,   but some people need both to control symptoms.    If you try these things for 2 weeks,  and they do not help,  stop them and make an appointment in the office.      Also - for  allergy eye symptoms   (redness, itch , watering)    -  you can try Olopatadine  - an over the counter eye drop that works very well.         Please think about the Shingrix vaccines (for shingles )  -   And the RSV vaccine -  Flu and a covid booster in the fall   YOU CAN GET THESE AT THE PHARMACY       I WILL MAIL YOU YOUR TEST RESULTS -IF YOU DO NO GET THEM WITHIN A FEW WEEKS CALL ME    Hypertension Reminders:    IF YOU ARE A PERSON WHOSE BLOOD PRESSURE RUNS HIGH IN THE DOCTOR'S OFFICE,  THEN WE NEED TO VERIFY YOUR CUFF AT LEAST  ONCE YEARLY.   ALWAYS BRING CUFF WITH YOU TO ANY HYPERTENSION CHECK UP APPOINTMENT.  WE CAN RECORD YOUR BP  FROM HOME THE DAY OF THE APPOINTMENT - BUT WE HAVE TO SEE IT ON YOUR MACHINE.      To accurately check your blood pressure -  be sure to sit and relax for 5 minutes, you need your back supported, feet flat on the ground, arm heart level and relaxed.    Generally speaking, well controlled hypertension is below 130/80   for  most people  and if you are over 75, below 140/90  is acceptable.    Please take your medication as directed, and if you forget a dose  DO NOT DOUBLE THE DOSE THE NEXT DAY, just take is as you normally would.     It is important to stay on a low sodium diet :   "1500 - 2000 mg of sodium a day  -  it is important to read labels.    Regular Exercise is very important as well.  Always gradually increase your exercise regimen.  Your goal is 30 minutes of a good cardiovascular exercise at least 5 days a week.    IF YOUR BMI (BODY MASS INDEX) IS OVER 25, LOSING WEIGHT WILL HELP CONTROL YOUR BLOOD PRESSURE.   Talk to me further if you need help doing this.        It is very important NOT to smoke  or use any tobacco products.  Talk to me about options if you want help quitting.    It is very important to keep your alcohol in take low.   Generally speaking, adult men should not drink more than 2 regular size beers a day, or no more than 2 ounces of liquor, or no more than 12 ounces of wine.  For adult women - the recommendations are half that.    BUT , THIS IS NOT UNIVERSAL   - be sure to ask me if alcohol is safe for you to drink, and if so, the acceptable amount.        For General Healthy Nutrition    (Remember - NOT A DIET!   Diets are only good for class reunions.)    These are my general good nutrition recommendations for most people.   I use the term \" diet \"  in these instructions to mean your overall nutrition - how you eat and drink.   If we talked about something different during your visit with me,  other than what is written below,  follow that advice instead.       For most people,  eating healthier means getting less added sugar and less processed foods in your diet    The fresher the better.    Added sugar is now a part of the nutrition label on manufactured food, so you can keep an eye on it easier.    But basically,  foods and beverages  that contain regular sugar and corn syrup are the main sources of added sugars.  Eating as little of these foods as you can is best.   One shocking example of the epidemic of added sugar is soda.    One can of regular soda contains about as much added sugar as 3 regular size doughnuts!     The other issue with processed foods is " the amount of processed grains they contain , such as white flour.    This is also something you want to try to limit in your diet.     But, grain products are very important for your nutrition.    Whole grains are better for your body.     Cutting back on white breads, traditional pasta, baked goods, white rice,  and processed cereals will be healthier for you.   The better choices include whole grain breads,  whole wheat pasta,  brown rice, quinoa, barley, steel cut  or rolled oats.   If you eat cereal for breakfast, try to look for one made with whole grains and less sugar.   There are many people who have a problem with gluten, for a large variety of reasons.    Generally,  products made with wheat flour , barley or rye are the primary source of gluten.       Cutting back on saturated fats is important.    You want the majority of the meat that you eat to be chicken, fish or turkey.   Baked or broiled is best -  fried adds too much fat.    There are healthy fats that are important - fat is important for holding down appetite, vitamin absorption and several metabolic processes in the body.  Monounsaturated fats raise HDL (good cholesterol) and lower LDL (bad cholesterol).   Olive oil, peanut oil, nuts, seeds, and avocados are great sources of the good fats.       Ideas are:   Trade sour cream dip for hummus (which is rich in olive oil) or guacamole; use veggies or whole-wheat chips to dip.    Nuts are an excellent source of protein and healthy fats.   Tree nuts are the best kind, such as almonds or walnuts.   Just be careful - they are high in calories, so stick to a serving size.  (Most are about 200 calories for a 1/4 cup)      Proteins are very important for your body, and they also hold down your appetite.   Try to have protein with every meal.    These generally are meats, nuts, many beans, legumes, eggs, and dairy.   You will find protein in whole grain products and some green vegetables have a little  "too.     When you have dairy (if you can - many people are lactose intolerant) try to make it low fat.    Ideas are 1% milk, lowfat yogurt or cheeses, low fat cottage cheese.   I don't generally recommend FAT FREE because they often contain artificial products to improve taste, and the fat helps hold down your appetite.   If you are lactose intolerant, try to see if taking Lactaid before having dairy helps.      Fresh fruits and vegetables are VERY important.  The brighter the better.   Many vegetables are considered \"Free Foods\" - meaning you can eat as much as you want, and it does not matter.  These include tomatoes, cucumbers, celery, peppers, all the various lettuces and kale - to name a few.   Potatoes, corn and peas are starchy, so do have more calories, but are still healthy - you just want to watch the amount of them you eat.       Fruits are full of wonderful nutrition.   They contain natural sugar called fructose, so eating them in moderation is best.   Diabetics may need to pay careful attention to how their body reacts to the sugar.  Some fruits might drastically increase their blood sugar.      Eating smaller meals with a couple of small snacks is better for your metabolism than not eating for long amounts of time  (breakfast is very important).   Trying to avoid large meals is helpful too.    Eating like this helps keep your appetite down and keeps you in burning metabolism rather than storage metabolism so your body will use the calories you eat.       I do not tell people to stop eating sweets or snack foods - just limit the amounts you have.  The less the better.   Pay attention to serving sizes, and treat them as a treat.        Foods like doughnuts, pop tarts, sugar cereals, cookies  ARE NOT GOOD FOR BREAKFAST.   They are loaded with sugar and will cause you to be hungrier in the day and often not feel well.    Caffeine needs to be limited - no more than 2 servings a day.  Some people can't have " any at all.    (if you have any sleep or anxiety issues - stop the caffeine)   Coffee, many teas, many sodas, energy drinks, almost any diet supplement,  and chocolate all contain caffeine.      Water is important.   For most people, 8   x  8 ounces  a day are needed.  This may vary for some health issues.    If you need to be on a low sodium diet, that means looking at labels and eating only 1000 - 2000 mg of sodium a day.    Calcium intake is important.  3 servings of a high calcium food or drink a day is recommended.   This is usually a cup of milk, a cup of yogurt, an ounce of a hard cheese or 1.5 ounces of a soft cheese are the usual servings.   There are other high calcium foods - including soy or almond milk, broccoli,  almonds, dark green leafy vegetable.   Make sure you are not getting more than 1000  - 1200 mg of total calcium a day (unless you have been told you need more by a doctor).    Vitamin D 3 is important to absorb the calcium and for your immune system.   For children, 400 IU a day is recommended.   For adults - 800 - 5000 IU a day  is recommended.  (Often the amount needed is individualized for adults - be sure to ask how much is right for you)    Physical activity is very important for good health.    Finding activities that give you regular exercise is very important for good health.  Try to find exercise you enjoy doing on a regular basis.    30 minutes at least 5 days a week of a good cardiovascular exercise is recommended.   That means something that gets your heart rate going faster than your usual baseline and you can find yourself breathing harder than usual while you are exercising.  If you have not done any exercise in a long time,  make sure you ask if its safe for you to start,  and be sure to gradually work up to your goal.      If you need to lose weight,  following these recommendations will help you.   And if you are doing all of this and still not losing weight, then its likely  just the amount of food you are eating.   Learn to cut back on portion sizes.  Using smaller plates may help.  Healthy weight loss is  only about a pound a week.   You have to remember that whatever you do to lose the weight, you must be prepared to keep it up for life for the weight to stay off.     A lot of people have a lot of luck with using something like a fit bit,  or a program where you keep track of all of your calories that you eat and what you burn off in the day.        APPT 6 [facility]

## 2024-07-31 ENCOUNTER — APPOINTMENT (OUTPATIENT)
Dept: NEPHROLOGY | Facility: CLINIC | Age: 64
End: 2024-07-31
Payer: COMMERCIAL

## 2024-07-31 VITALS
WEIGHT: 224.2 LBS | DIASTOLIC BLOOD PRESSURE: 80 MMHG | HEIGHT: 70 IN | OXYGEN SATURATION: 93 % | SYSTOLIC BLOOD PRESSURE: 129 MMHG | RESPIRATION RATE: 16 BRPM | BODY MASS INDEX: 32.1 KG/M2 | TEMPERATURE: 97.7 F | HEART RATE: 74 BPM

## 2024-07-31 DIAGNOSIS — N06.0 ISOLATED PROTEINURIA WITH MINOR GLOMERULAR ABNORMALITY: Primary | ICD-10-CM

## 2024-07-31 DIAGNOSIS — N18.32 CHRONIC RENAL IMPAIRMENT, STAGE 3B (MULTI): ICD-10-CM

## 2024-07-31 DIAGNOSIS — I10 BENIGN ESSENTIAL HYPERTENSION: ICD-10-CM

## 2024-07-31 DIAGNOSIS — N28.9 IMPAIRED RENAL FUNCTION: ICD-10-CM

## 2024-07-31 PROCEDURE — 3078F DIAST BP <80 MM HG: CPT | Performed by: INTERNAL MEDICINE

## 2024-07-31 PROCEDURE — 82043 UR ALBUMIN QUANTITATIVE: CPT

## 2024-07-31 PROCEDURE — 82570 ASSAY OF URINE CREATININE: CPT

## 2024-07-31 PROCEDURE — 3008F BODY MASS INDEX DOCD: CPT | Performed by: INTERNAL MEDICINE

## 2024-07-31 PROCEDURE — 99214 OFFICE O/P EST MOD 30 MIN: CPT | Performed by: INTERNAL MEDICINE

## 2024-07-31 PROCEDURE — 3074F SYST BP LT 130 MM HG: CPT | Performed by: INTERNAL MEDICINE

## 2024-07-31 NOTE — PROGRESS NOTES
"Subjective       Ian Hubbard is a 63 y.o. male who has past medical history of  HTN, HLD, TRUMAN presents for follow-up management of CKD.  Last visit was in June of 2023.    Pt came alone to appt today, reports he has been making a big effort to stay more hydrated. He also has been trying hard to eat better and thinks he's doing a good job. He reports his blood pressure at home has remained very well controlled on his current medication regimen. He has not been taking Farxiga for a few months at this point.     He endorses some ankle swelling after work, where he is on his feet all day, but says it resolves quickly when he gets home and sits down. He does not feel short of breath.      Objective   /76 (BP Location: Left arm, Patient Position: Sitting, BP Cuff Size: Adult)   Pulse 74   Temp 36.5 °C (97.7 °F)   Resp 16   Ht 1.778 m (5' 10\")   Wt 102 kg (224 lb 3.2 oz)   SpO2 93%   BMI 32.17 kg/m²   Wt Readings from Last 3 Encounters:   07/31/24 102 kg (224 lb 3.2 oz)   06/21/24 102 kg (224 lb)   12/29/23 101 kg (222 lb)       Physical Exam  General appearance: no distress awake and alert on room air, euvolemic on exam  Eyes: non-icteric  HEENT: atrumatic head, PEERLA, moist mucosa  Skin: no apparent rash  Heart: NSR, S1, S2 normal, no murmur or gallop  Lungs: Symmetrical expansion,CTA bilat no wheezing/crackles  Abdomen: soft, nt/nd, obese  Extremities: +1 edema bilat  Neuro: No FND,asterixis, no focal deficits noticed        Review of systems  Constitutional: no fever, no chills, no recent weight gain and no recent weight loss.   Eyes: no blurred vision and no diplopia.   ENT: no hearing loss, no earache, no sore throat, no swollen glands in the neck and no nasal discharge.   Cardiovascular: no chest pain, no palpitations and no lower extremity edema.   Respiratory: no shortness of breath, no chronic cough and no shortness of breath during exertion.   Gastrointestinal: no abdominal pain, no " "constipation, no heartburn, no vomiting, no bloody stools and no change in bowel movements.   Genitourinary: no dysuria and no hematuria.   Musculoskeletal: no arthralgias and no myalgias.   Skin: no rashes and no skin lesions.   Neurological: no headaches and no dizziness.   Psychiatric: no confusion, no depression and no anxiety.   Endocrine: no heat intolerance, no cold intolerance, appetite not increased, no thyroid disorder, no increased urinary frequency and no dry skin.   Hematologic/Lymphatic: does not bleed easily and does not bruise easily.   All other systems have been reviewed and are negative for complaint.         Data Review                   Lab Results   Component Value Date    URICACID CANCELED 06/25/2023           Lab Results   Component Value Date    HGBA1C CANCELED 06/24/2023                 No lab exists for component: \"CR\", \"PHOSPHORUS\"        Albumin/Creatine Ratio   Date Value Ref Range Status   06/23/2023 33.6 (H) 0.0 - 30.0 ug/mg crt Final   06/23/2023 32.4 (H) 0.0 - 30.0 ug/mg crt Final   04/18/2023 50.6 (H) 0.0 - 30.0 ug/mg crt Final            RFP  Recent Labs     06/21/24  0918 12/29/23  0942 06/25/23  0057 06/23/23  1055 01/20/23  1804 12/30/22  1100 05/27/22  1447    138 CANCELED 137 140 132* 137   K 4.3 4.5 CANCELED 4.3 4.4 4.2 3.9    103 CANCELED 100 103 96* 100   CO2 24 24 CANCELED 27 26 30 29   BUN 20 25* CANCELED 24* 26* 23 22   CREATININE 1.71* 1.67* CANCELED 1.79* 1.97* 1.90* 1.67*   GLUCOSE 97 98 CANCELED 92 101* 99 100*   CALCIUM 9.4 9.4 CANCELED 9.2 9.4 9.8 9.3   PHOS  --  3.6 CANCELED 3.9  --   --   --    EGFR 44* 45*  --   --   --   --   --    ANIONGAP 14 16 CANCELED 14 15 10 12        Urineanalysis  Recent Labs     06/23/23  1145 04/18/23  1300   COLORU CANCELED  YELLOW YELLOW   APPEARANCEU CANCELED  CLEAR CLEAR   SPECGRAVU CANCELED  1.013 1.021   JESUS MANUEL CANCELED  6.0 5.0   PROTUR CANCELED  NEGATIVE 101*  30(1+)*   GLUCOSEU CANCELED  >=500 (3+)* NEGATIVE "   BLOODU CANCELED  NEGATIVE SMALL(1+)*   KETONESU CANCELED  NEGATIVE NEGATIVE   BILIRUBINU CANCELED  NEGATIVE NEGATIVE   NITRITEU CANCELED  NEGATIVE NEGATIVE   LEUKOCYTESU CANCELED  NEGATIVE NEGATIVE       Urine Electrolytes  Recent Labs     06/23/23  1145 04/18/23  1300   SODIUMURR  --  56   NACREATUR  --  29   KUR  --  106   KCREATUR  --  54   CREATU 63.1  62.6 196.0  196.0  196.0   PROTUR CANCELED  NEGATIVE 101*  30(1+)*   UTPCR  --  0.52*   MICROALBCREA 33.6*  32.4* 50.6*        Urine Micro  Recent Labs     04/18/23  1300   WBCU 1   RBCU NONE   SQUAMEPIU <1        Iron  Recent Labs     06/25/23  0057 06/23/23  1055   IRON CANCELED 79   TIBC CANCELED 313   IRONSAT CANCELED 25   FERRITIN CANCELED 210          Current Outpatient Medications on File Prior to Visit   Medication Sig Dispense Refill    albuterol 90 mcg/actuation inhaler Inhale 2 puffs every 4 hours if needed for wheezing or shortness of breath. 18 g 2    lisinopril 40 mg tablet Take 1 tablet (40 mg) by mouth once daily. 90 tablet 1    metoprolol succinate XL (Toprol-XL) 100 mg 24 hr tablet Take 1 tablet (100 mg) by mouth once daily. 90 tablet 1    rosuvastatin (Crestor) 20 mg tablet Take 1 tablet (20 mg) by mouth once daily. 90 tablet 1    triamcinolone (Nasacort) 55 mcg nasal inhaler Administer into affected nostril(s) once daily.       No current facility-administered medications on file prior to visit.           Assessment and Plan       Ian Hubbard  is a 63 y.o. male who has past medical history of  HTN, HLD, TRUMAN presents for follow-up management of CKD.      # Chronic kidney disease - 2  - Baseline SCr was worsening, up to 1.90  - Most recent Scr 1.71  - However cystatin C was 1.21, eGFR based on this is 60  - Most likely related to atherosclerosis  - Recent urin negative for protein, blood, LE  - Prior kidney US showed left renal cyst, no evidence of stone/obstruction  - Lytes : Na 137, K 4.3, Cl 100  - Was previously on Farxiga  10mg, however has not been taking for several months, thinks he ran out and didn't get refilled  - [facility] stopped last appt   - Will not restart Farxiga today, no need  - Will repeat alb/cr ratio given he did have some proteinuria in the past  - No changes at this time      # BP - today at office  123/76 orthostatic vitals   - Within target  - Current meds: Lisinopril, metoprolol  - No Changes       #No significant anemia  - Hgb 13.4    #(CKD)-MBD  - Ca 9.2, Phos 3.9    # [facility]  - On statins      #Others  - No NSAIDs, no contrast as possible. If to be done- we recommend holding ACEi/ARBS/diuretics 24 hrs prior to contrast exposure and ensure appropriate hydration   - Ensure well hydrated  - Limit salt in diet  - No smoking    Follow-up in 1 year w/ repeat labs    Savanah Plaza DO  PGY-1

## 2024-11-18 ENCOUNTER — TELEPHONE (OUTPATIENT)
Dept: PRIMARY CARE | Facility: CLINIC | Age: 64
End: 2024-11-18
Payer: COMMERCIAL

## 2024-11-18 NOTE — LETTER
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       Sincerely,     Dr Laquita Lester MD       CC: No Recipients  ______________________________________________________________________________________

## 2024-11-18 NOTE — TELEPHONE ENCOUNTER
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

## 2024-12-13 ENCOUNTER — APPOINTMENT (OUTPATIENT)
Dept: PRIMARY CARE | Facility: CLINIC | Age: 64
End: 2024-12-13

## 2024-12-13 VITALS
WEIGHT: 233 LBS | OXYGEN SATURATION: 98 % | SYSTOLIC BLOOD PRESSURE: 122 MMHG | BODY MASS INDEX: 33.43 KG/M2 | HEART RATE: 54 BPM | DIASTOLIC BLOOD PRESSURE: 64 MMHG

## 2024-12-13 DIAGNOSIS — I10 BENIGN ESSENTIAL HTN: ICD-10-CM

## 2024-12-13 DIAGNOSIS — E78.5 DYSLIPIDEMIA: ICD-10-CM

## 2024-12-13 DIAGNOSIS — I10 BENIGN ESSENTIAL HYPERTENSION: Primary | ICD-10-CM

## 2024-12-13 LAB
ANION GAP SERPL CALC-SCNC: 15 MMOL/L (ref 10–20)
BASOPHILS # BLD AUTO: 0.03 X10*3/UL (ref 0–0.1)
BASOPHILS NFR BLD AUTO: 0.7 %
BUN SERPL-MCNC: 21 MG/DL (ref 6–23)
CALCIUM SERPL-MCNC: 9.1 MG/DL (ref 8.6–10.3)
CHLORIDE SERPL-SCNC: 102 MMOL/L (ref 98–107)
CO2 SERPL-SCNC: 28 MMOL/L (ref 21–32)
CREAT SERPL-MCNC: 1.75 MG/DL (ref 0.5–1.3)
EGFRCR SERPLBLD CKD-EPI 2021: 43 ML/MIN/1.73M*2
EOSINOPHIL # BLD AUTO: 0.22 X10*3/UL (ref 0–0.7)
EOSINOPHIL NFR BLD AUTO: 4.8 %
ERYTHROCYTE [DISTWIDTH] IN BLOOD BY AUTOMATED COUNT: 11.7 % (ref 11.5–14.5)
GLUCOSE SERPL-MCNC: 93 MG/DL (ref 74–99)
HCT VFR BLD AUTO: 45.3 % (ref 41–52)
HGB BLD-MCNC: 13.6 G/DL (ref 13.5–17.5)
IMM GRANULOCYTES # BLD AUTO: 0.01 X10*3/UL (ref 0–0.7)
IMM GRANULOCYTES NFR BLD AUTO: 0.2 % (ref 0–0.9)
LYMPHOCYTES # BLD AUTO: 2.09 X10*3/UL (ref 1.2–4.8)
LYMPHOCYTES NFR BLD AUTO: 46 %
MCH RBC QN AUTO: 30.8 PG (ref 26–34)
MCHC RBC AUTO-ENTMCNC: 30 G/DL (ref 32–36)
MCV RBC AUTO: 103 FL (ref 80–100)
MONOCYTES # BLD AUTO: 0.37 X10*3/UL (ref 0.1–1)
MONOCYTES NFR BLD AUTO: 8.1 %
NEUTROPHILS # BLD AUTO: 1.82 X10*3/UL (ref 1.2–7.7)
NEUTROPHILS NFR BLD AUTO: 40.2 %
NRBC BLD-RTO: 0 /100 WBCS (ref 0–0)
PLATELET # BLD AUTO: 221 X10*3/UL (ref 150–450)
POTASSIUM SERPL-SCNC: 4.5 MMOL/L (ref 3.5–5.3)
RBC # BLD AUTO: 4.41 X10*6/UL (ref 4.5–5.9)
SODIUM SERPL-SCNC: 140 MMOL/L (ref 136–145)
WBC # BLD AUTO: 4.5 X10*3/UL (ref 4.4–11.3)

## 2024-12-13 PROCEDURE — 3074F SYST BP LT 130 MM HG: CPT | Performed by: FAMILY MEDICINE

## 2024-12-13 PROCEDURE — 90662 IIV NO PRSV INCREASED AG IM: CPT | Performed by: FAMILY MEDICINE

## 2024-12-13 PROCEDURE — 85025 COMPLETE CBC W/AUTO DIFF WBC: CPT

## 2024-12-13 PROCEDURE — 1159F MED LIST DOCD IN RCRD: CPT | Performed by: FAMILY MEDICINE

## 2024-12-13 PROCEDURE — 90471 IMMUNIZATION ADMIN: CPT | Performed by: FAMILY MEDICINE

## 2024-12-13 PROCEDURE — 3078F DIAST BP <80 MM HG: CPT | Performed by: FAMILY MEDICINE

## 2024-12-13 PROCEDURE — 1160F RVW MEDS BY RX/DR IN RCRD: CPT | Performed by: FAMILY MEDICINE

## 2024-12-13 PROCEDURE — 1036F TOBACCO NON-USER: CPT | Performed by: FAMILY MEDICINE

## 2024-12-13 PROCEDURE — 80048 BASIC METABOLIC PNL TOTAL CA: CPT

## 2024-12-13 PROCEDURE — 99214 OFFICE O/P EST MOD 30 MIN: CPT | Performed by: FAMILY MEDICINE

## 2024-12-13 RX ORDER — METOPROLOL SUCCINATE 100 MG/1
100 TABLET, EXTENDED RELEASE ORAL DAILY
Qty: 90 TABLET | Refills: 1 | Status: SHIPPED | OUTPATIENT
Start: 2024-12-13

## 2024-12-13 RX ORDER — LISINOPRIL 40 MG/1
40 TABLET ORAL DAILY
Qty: 90 TABLET | Refills: 1 | Status: SHIPPED | OUTPATIENT
Start: 2024-12-13

## 2024-12-13 RX ORDER — ROSUVASTATIN CALCIUM 20 MG/1
20 TABLET, COATED ORAL DAILY
Qty: 90 TABLET | Refills: 1 | Status: SHIPPED | OUTPATIENT
Start: 2024-12-13

## 2024-12-13 NOTE — PATIENT INSTRUCTIONS
"Flu shot today     Think about getting COVID booster, RSV vaccine and the Shingles  (Shingrix) shots at the pharmacy         Sleep Hygiene:     If you are having trouble sleeping, you will want to follow these tips:    1) Have a set bedtime and awakening time that you want to stick to every night.      2)  Avoid caffeine - some people can just stop 4 - 5 hours before bedtime, but many people are sensitive to even a small amount in the day.  Remember -  coffee, some teas, energy drinks, diet medications, and chocolate are all known to contain caffeine.       3) Create a \"think about tomorrow\" notepad to keep by your bed.  When you are lying in bed thinking about something that you can't get out of your mind, write it down.  This helps your brain \"let it go\" for the night.      4)  A cool room often helps.      5) If you are lying in bed and just can't fall asleep, then get out of bed.  Find a calm and quiet place, and read something that is VERY BORING until you feel sleepy.  Then go back to bed.  Keep this pattern up until you fall asleep.  Don't forget to wake up at your set awakening time - even if you only fell asleep 2 hours before!   This will help you fall asleep the next night.       6) No napping in the day.      7) White noise often helps - a fan usually works great     8) And finally  - no electronics one hour before bed, and definitely not any in the night.  The light from them stimulates the awake center if the brain.       If this does not help you sleep better - we should get you another sleep study - it can be a home one.         Hypertension Reminders:    IF YOU ARE A PERSON WHOSE BLOOD PRESSURE RUNS HIGH IN THE DOCTOR'S OFFICE,  THEN WE NEED TO VERIFY YOUR CUFF AT LEAST  ONCE YEARLY.   ALWAYS BRING CUFF WITH YOU TO ANY HYPERTENSION CHECK UP APPOINTMENT.  WE CAN RECORD YOUR BP  FROM HOME THE DAY OF THE APPOINTMENT - BUT WE HAVE TO SEE IT ON YOUR MACHINE.      To accurately check your blood pressure - " " be sure to sit and relax for 5 minutes, you need your back supported, feet flat on the ground, arm heart level and relaxed.    Generally speaking, well controlled hypertension is below 130/80   for  most people  and if you are over 75, below 140/90  is acceptable.    Please take your medication as directed, and if you forget a dose  DO NOT DOUBLE THE DOSE THE NEXT DAY, just take is as you normally would.     It is important to stay on a low sodium diet :  1500 - 2000 mg of sodium a day  -  it is important to read labels.    Regular Exercise is very important as well.  Always gradually increase your exercise regimen.  Your goal is 30 minutes of a good cardiovascular exercise at least 5 days a week.    IF YOUR BMI (BODY MASS INDEX) IS OVER 25, LOSING WEIGHT WILL HELP CONTROL YOUR BLOOD PRESSURE.   Talk to me further if you need help doing this.        It is very important NOT to smoke  or use any tobacco products.  Talk to me about options if you want help quitting.    It is very important to keep your alcohol in take low.   Generally speaking, adult men should not drink more than 2 regular size beers a day, or no more than 2 ounces of liquor, or no more than 12 ounces of wine.  For adult women - the recommendations are half that.    BUT , THIS IS NOT UNIVERSAL   - be sure to ask me if alcohol is safe for you to drink, and if so, the acceptable amount.          For General Healthy Nutrition    (Remember - NOT A DIET!   Diets are only good for class reunions.)    These are my general good nutrition recommendations for most people.   I use the term \" diet \"  in these instructions to mean your overall nutrition - how you eat and drink.   If we talked about something different during your visit with me,  other than what is written below,  follow that advice instead.       For most people,  eating healthier means getting less added sugar and less processed foods in your diet    The fresher the better.    Added sugar is " now a part of the nutrition label on manufactured food, so you can keep an eye on it easier.    But basically,  foods and beverages  that contain regular sugar and corn syrup are the main sources of added sugars.  Eating as little of these foods as you can is best.   One shocking example of the epidemic of added sugar is soda.    One can of regular soda contains about as much added sugar as 3 regular size doughnuts!     The other issue with processed foods is the amount of processed grains they contain , such as white flour.    This is also something you want to try to limit in your diet.     But, grain products are very important for your nutrition.    Whole grains are better for your body.     Cutting back on white breads, traditional pasta, baked goods, white rice,  and processed cereals will be healthier for you.   The better choices include whole grain breads,  whole wheat pasta,  brown rice, quinoa, barley, steel cut  or rolled oats.   If you eat cereal for breakfast, try to look for one made with whole grains and less sugar.   There are many people who have a problem with gluten, for a large variety of reasons.    Generally,  products made with wheat flour , barley or rye are the primary source of gluten.       Cutting back on saturated fats is important.    You want the majority of the meat that you eat to be chicken, fish or turkey.   Baked or broiled is best -  fried adds too much fat.    There are healthy fats that are important - fat is important for holding down appetite, vitamin absorption and several metabolic processes in the body.  Monounsaturated fats raise HDL (good cholesterol) and lower LDL (bad cholesterol).   Olive oil, peanut oil, nuts, seeds, and avocados are great sources of the good fats.       Ideas are:   Trade sour cream dip for hummus (which is rich in olive oil) or guacamole; use veggies or whole-wheat chips to dip.    Nuts are an excellent source of protein and healthy fats.   Tree  "nuts are the best kind, such as almonds or walnuts.   Just be careful - they are high in calories, so stick to a serving size.  (Most are about 200 calories for a 1/4 cup)      Proteins are very important for your body, and they also hold down your appetite.   Try to have protein with every meal.    These generally are meats, nuts, many beans, legumes, eggs, and dairy.   You will find protein in whole grain products and some green vegetables have a little too.     When you have dairy (if you can - many people are lactose intolerant) try to make it low fat.    Ideas are 1% milk, lowfat yogurt or cheeses, low fat cottage cheese.   I don't generally recommend FAT FREE because they often contain artificial products to improve taste, and the fat helps hold down your appetite.   If you are lactose intolerant, try to see if taking Lactaid before having dairy helps.      Fresh fruits and vegetables are VERY important.  The brighter the better.   Many vegetables are considered \"Free Foods\" - meaning you can eat as much as you want, and it does not matter.  These include tomatoes, cucumbers, celery, peppers, all the various lettuces and kale - to name a few.   Potatoes, corn and peas are starchy, so do have more calories, but are still healthy - you just want to watch the amount of them you eat.       Fruits are full of wonderful nutrition.   They contain natural sugar called fructose, so eating them in moderation is best.   Diabetics may need to pay careful attention to how their body reacts to the sugar.  Some fruits might drastically increase their blood sugar.      Eating smaller meals with a couple of small snacks is better for your metabolism than not eating for long amounts of time  (breakfast is very important).   Trying to avoid large meals is helpful too.    Eating like this helps keep your appetite down and keeps you in burning metabolism rather than storage metabolism so your body will use the calories you eat.    "    I do not tell people to stop eating sweets or snack foods - just limit the amounts you have.  The less the better.   Pay attention to serving sizes, and treat them as a treat.        Foods like doughnuts, pop tarts, sugar cereals, cookies  ARE NOT GOOD FOR BREAKFAST.   They are loaded with sugar and will cause you to be hungrier in the day and often not feel well.    Caffeine needs to be limited - no more than 2 servings a day.  Some people can't have any at all.    (if you have any sleep or anxiety issues - stop the caffeine)   Coffee, many teas, many sodas, energy drinks, almost any diet supplement,  and chocolate all contain caffeine.      Water is important.   For most people, 8   x  8 ounces  a day are needed.  This may vary for some health issues.    If you need to be on a low sodium diet, that means looking at labels and eating only 1000 - 2000 mg of sodium a day.    Calcium intake is important.  3 servings of a high calcium food or drink a day is recommended.   This is usually a cup of milk, a cup of yogurt, an ounce of a hard cheese or 1.5 ounces of a soft cheese are the usual servings.   There are other high calcium foods - including soy or almond milk, broccoli,  almonds, dark green leafy vegetable.   Make sure you are not getting more than 1000  - 1200 mg of total calcium a day (unless you have been told you need more by a doctor).    Vitamin D 3 is important to absorb the calcium and for your immune system.   For children, 400 IU a day is recommended.   For adults - 800 - 5000 IU a day  is recommended.  (Often the amount needed is individualized for adults - be sure to ask how much is right for you)    Physical activity is very important for good health.    Finding activities that give you regular exercise is very important for good health.  Try to find exercise you enjoy doing on a regular basis.    30 minutes at least 5 days a week of a good cardiovascular exercise is recommended.   That means  something that gets your heart rate going faster than your usual baseline and you can find yourself breathing harder than usual while you are exercising.  If you have not done any exercise in a long time,  make sure you ask if its safe for you to start,  and be sure to gradually work up to your goal.      If you need to lose weight,  following these recommendations will help you.   And if you are doing all of this and still not losing weight, then its likely just the amount of food you are eating.   Learn to cut back on portion sizes.  Using smaller plates may help.  Healthy weight loss is  only about a pound a week.   You have to remember that whatever you do to lose the weight, you must be prepared to keep it up for life for the weight to stay off.     A lot of people have a lot of luck with using something like a fit bit,  or a program where you keep track of all of your calories that you eat and what you burn off in the day.        Appt 6 months
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
## 2025-06-27 ENCOUNTER — APPOINTMENT (OUTPATIENT)
Dept: PRIMARY CARE | Facility: CLINIC | Age: 65
End: 2025-06-27
Payer: COMMERCIAL

## 2025-06-27 VITALS
SYSTOLIC BLOOD PRESSURE: 126 MMHG | OXYGEN SATURATION: 98 % | BODY MASS INDEX: 33.29 KG/M2 | DIASTOLIC BLOOD PRESSURE: 66 MMHG | HEART RATE: 68 BPM | WEIGHT: 232 LBS

## 2025-06-27 DIAGNOSIS — N18.32 CHRONIC RENAL IMPAIRMENT, STAGE 3B (MULTI): ICD-10-CM

## 2025-06-27 DIAGNOSIS — J45.20 MILD INTERMITTENT ASTHMA WITHOUT COMPLICATION (HHS-HCC): ICD-10-CM

## 2025-06-27 DIAGNOSIS — E78.5 DYSLIPIDEMIA: ICD-10-CM

## 2025-06-27 DIAGNOSIS — I10 BENIGN ESSENTIAL HYPERTENSION: Primary | ICD-10-CM

## 2025-06-27 DIAGNOSIS — Z00.00 WELLNESS EXAMINATION: ICD-10-CM

## 2025-06-27 DIAGNOSIS — Z12.5 SCREENING FOR PROSTATE CANCER: ICD-10-CM

## 2025-06-27 DIAGNOSIS — I10 BENIGN ESSENTIAL HTN: ICD-10-CM

## 2025-06-27 PROCEDURE — 1160F RVW MEDS BY RX/DR IN RCRD: CPT | Performed by: FAMILY MEDICINE

## 2025-06-27 PROCEDURE — 1159F MED LIST DOCD IN RCRD: CPT | Performed by: FAMILY MEDICINE

## 2025-06-27 PROCEDURE — 3074F SYST BP LT 130 MM HG: CPT | Performed by: FAMILY MEDICINE

## 2025-06-27 PROCEDURE — 99214 OFFICE O/P EST MOD 30 MIN: CPT | Performed by: FAMILY MEDICINE

## 2025-06-27 PROCEDURE — 99397 PER PM REEVAL EST PAT 65+ YR: CPT | Performed by: FAMILY MEDICINE

## 2025-06-27 PROCEDURE — 1036F TOBACCO NON-USER: CPT | Performed by: FAMILY MEDICINE

## 2025-06-27 PROCEDURE — 3078F DIAST BP <80 MM HG: CPT | Performed by: FAMILY MEDICINE

## 2025-06-27 RX ORDER — METOPROLOL SUCCINATE 100 MG/1
100 TABLET, EXTENDED RELEASE ORAL DAILY
Qty: 90 TABLET | Refills: 1 | Status: SHIPPED | OUTPATIENT
Start: 2025-06-27

## 2025-06-27 RX ORDER — ALBUTEROL SULFATE AND BUDESONIDE 90; 80 UG/1; UG/1
2 AEROSOL, METERED RESPIRATORY (INHALATION) EVERY 6 HOURS PRN
Qty: 10.7 G | Refills: 2 | Status: SHIPPED | OUTPATIENT
Start: 2025-06-27

## 2025-06-27 RX ORDER — ROSUVASTATIN CALCIUM 20 MG/1
20 TABLET, COATED ORAL DAILY
Qty: 90 TABLET | Refills: 1 | Status: SHIPPED | OUTPATIENT
Start: 2025-06-27

## 2025-06-27 RX ORDER — LISINOPRIL 40 MG/1
40 TABLET ORAL DAILY
Qty: 90 TABLET | Refills: 1 | Status: SHIPPED | OUTPATIENT
Start: 2025-06-27

## 2025-06-27 ASSESSMENT — PATIENT HEALTH QUESTIONNAIRE - PHQ9
1. LITTLE INTEREST OR PLEASURE IN DOING THINGS: NOT AT ALL
2. FEELING DOWN, DEPRESSED OR HOPELESS: NOT AT ALL
SUM OF ALL RESPONSES TO PHQ9 QUESTIONS 1 AND 2: 0

## 2025-06-27 NOTE — ASSESSMENT & PLAN NOTE
Orders:    Comprehensive Metabolic Panel    Lipid Panel    CBC and Auto Differential    TSH with reflex to Free T4 if abnormal    BP under great control -   Very active   Offered Coronary CT again - he is contemplating only
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
## 2025-06-27 NOTE — ASSESSMENT & PLAN NOTE
Orders:    albuterol-budesonide (Airsupra) 90-80 mcg/actuation inhaler; Inhale 2 puffs every 6 hours if needed (cough or shortness of breath).    Gave him the Airsurpa coupon card - education on use

## 2025-06-27 NOTE — PATIENT INSTRUCTIONS
"I sent Airsurpa for you to the pharmacy to use in place of the albuterol  -   Rinse mouth with water after you use it.   Use it the same way as you would your albuterol.  Use the coupon card     If its too expensive - let me know.        Sleep Hygiene:     If you are having trouble sleeping, you will want to follow these tips:    1) Have a set bedtime and awakening time that you want to stick to every night.      2)  Avoid caffeine - some people can just stop 4 - 5 hours before bedtime, but many people are sensitive to even a small amount in the day.  Remember -  coffee, some teas, energy drinks, diet medications, and chocolate are all known to contain caffeine.       3) Create a \"think about tomorrow\" notepad to keep by your bed.  When you are lying in bed thinking about something that you can't get out of your mind, write it down.  This helps your brain \"let it go\" for the night.      4)  A cool room often helps.      5) If you are lying in bed and just can't fall asleep, then get out of bed.  Find a calm and quiet place, and read something that is VERY BORING until you feel sleepy.  Then go back to bed.  Keep this pattern up until you fall asleep.  Don't forget to wake up at your set awakening time - even if you only fell asleep 2 hours before!   This will help you fall asleep the next night.       6) No napping in the day.      7) White noise often helps - a fan usually works great     8) And finally  - no electronics one hour before bed, and definitely not any in the night.  The light from them stimulates the awake center if the brain.       Plan a flu shot and covid booster this fall  - at the pharmacy     Check with your insurance on the Shingrix vaccine-  see if they cover it and where they want you to get it.   Can get when on medicare for sure  - at the pharmacy   If our office - you can make a nurse appt for it -  let me know and I will place the order after you have an appt.   If the pharmacy - then " check with the pharmacy to see if you need an appointment.    Its two injections -  2 - 6 months apart.           Hypertension Reminders:    IF YOU ARE A PERSON WHOSE BLOOD PRESSURE RUNS HIGH IN THE DOCTOR'S OFFICE,  THEN WE NEED TO VERIFY YOUR CUFF AT LEAST  ONCE YEARLY.   ALWAYS BRING CUFF WITH YOU TO ANY HYPERTENSION CHECK UP APPOINTMENT.  WE CAN RECORD YOUR BP  FROM HOME THE DAY OF THE APPOINTMENT - BUT WE HAVE TO SEE IT ON YOUR MACHINE.      To accurately check your blood pressure -  be sure to sit and relax for 5 minutes, you need your back supported, feet flat on the ground, arm heart level and relaxed.    Generally speaking, well controlled hypertension is below 130/80   for  most people  and if you are over 75, below 140/90  is acceptable.    Please take your medication as directed, and if you forget a dose  DO NOT DOUBLE THE DOSE THE NEXT DAY, just take is as you normally would.     It is important to stay on a low sodium diet :  1500 - 2000 mg of sodium a day  -  it is important to read labels.    Regular Exercise is very important as well.  Always gradually increase your exercise regimen.  Your goal is 30 minutes of a good cardiovascular exercise at least 5 days a week.    IF YOUR BMI (BODY MASS INDEX) IS OVER 25, LOSING WEIGHT WILL HELP CONTROL YOUR BLOOD PRESSURE.   Talk to me further if you need help doing this.        It is very important NOT to smoke  or use any tobacco products.  Talk to me about options if you want help quitting.    It is very important to keep your alcohol in take low.   Generally speaking, adult men should not drink more than 2 regular size beers a day, or no more than 2 ounces of liquor, or no more than 12 ounces of wine.  For adult women - the recommendations are half that.    BUT , THIS IS NOT UNIVERSAL   - be sure to ask me if alcohol is safe for you to drink, and if so, the acceptable amount.        WELL VISIT/PREVENTATIVE VISIT INSTRUCTIONS:        Call [phone] to  schedule any testing ordered at [facility].      For a mammogram, call   [phone]    Please work on healthy nutrition,  optimal sleep, and regular exercise to feel better.    If you smoke - please quit, and if you drink alcohol, please limit your intake.       Be sure to ask me about any vaccines you may be due for,  and ask me any questions you may have about vaccines.   Generally -  a flu shot is recommended every fall for everyone over 6 months of age,  a pneumonia shot is recommended for anyone 65 and over or who has chronic conditions such as diabetes, heart or lung disease,  the shingles vaccines are recommended for people age 50 and over,   a Tetnas/Pertussis booster is very 10 years (after the childhood set);  the RSV vaccine is for people age 65 and over,  and the COVID vaccines are recommended for everyone, but the boosters are often particular people, so ask me if you qualify.      There may be more vaccines you qualify for depending on your medical conditions, so be sure to ask me about that if you have any chronic illnesses.    Some people have insurance that will pay for the vaccines at the pharmacy, and some your doctors office - so be sure to check with your insurance about the best place to get your vaccines and your coverage of them.     Generally speaking,  good nutrition consists of lean meats, like chicken, fish and turkey (not fried);    plenty of fruits and vegetables (the fresher the better);   Less sugars, saturated fats, and processed foods - especially those made with white flour.   Try to eat the majority of your grain foods  as whole grains.   Keep an eye on your total calories in a day and serving sizes on packaging - this will help you limit your overall intake.    Remember, to lose weight (if you need to), your total calorie intake has to be about 300 - 500 calories less than what you burn in a day.   That number depends on your age, gender and body size.   Some people  "find a fitbit (calorie tracking device) helpful.      Please be sure to see the dentist every 6 months.    Please see the eye doctor no longer than every 2 years.    When you have your Living Will and Medical Power of  papers completed   (they have to be witnessed by 2 non-relatives or notarized to be legally binding),     please keep your originals in a safe place in your home, but make sure all your physicians have copies and that you take copies with you when you go to the hospital.        GETTING TEST RESULTS:    YOU WILL ALWAYS FIND OUT ABOUT ALL YOUR TEST RESULTS FROM ME.  I do not use the \"no news is good news\" policy.   The only time you would not, is if I have told you to get the testing done, and make a follow up appointment to go over it.    Then I will be waiting to talk to you at the visit about your test results.     I have a few different ways I get test results to you  (if its something urgent, we call you)  :       If you have a [employer] card -  I will have your test results on your secureach card within a week.  You should get a phone call telling you when the results are on the card, or just call the card in a week.   If two weeks go  by and you have not heard anything, call the card to see if the results are there,  and if not,  leave me a message.  If you are having trouble using [employer], they have a support line you should call :   [phone];   If you have lost your card, I need to know.     IT'S VERY IMPORTANT THAT YOU CALL TO LISTEN TO YOUR RESULTS, AS IT THEN LETS ME KNOW YOU GOT YOUR RESULTS.        If you get your test results on MY CHART,  you may commonly see your results even before I do.      I will always annotate a message on your results so you know that I saw them and how I feel about them.     If you need some help with MY CHART call the support number at [phone].    IF I mail your results to you,   I need to hear from you if you do not receive them " "within 2 weeks.      Be sure to let me know your preference for getting results.         BILLING FOR PREVENTATIVE VISITS.     Most insurance companies pay for a yearly \"Wellness Check\"  or they may call it a \"Preventative Physical\"   - but it's important to know what your plan covers ahead of the visit.    It's also a good idea to find out what sort of preventative testing they will cover for screening tests - like cholesterol, blood sugar, or colonoscopies. Also, find out which vaccines are covered and if you have any responsibility in paying for them.       If at your Wellness Visit,  we cover anything to do with problems/issues  you are having or  chronic medications/ medical conditions you have,   there will ALSO be a regular office charge that day.     Blood work  or testing obtained that has anything to do with an acute issue or chronic condition is billed under that diagnosis and not screening.       It's a good idea to find out from your insurance what is covered and what is your responsibility for all of the above possible charges.           Most importantly,   if you ever have any questions or concerns that cannot wait until your next visit with me,  you can message me through MY CHART or call the office and leave a voice mail message  (please allow for at least 24 hours for responses for these messages).       Take good care.   Dr Lester          For General Healthy Nutrition    (Remember - NOT A DIET!   Diets are only good for class reunions.)    These are my general good nutrition recommendations for most people.   I use the term \" diet \"  in these instructions to mean your overall nutrition - how you eat and drink.   If we talked about something different during your visit with me,  other than what is written below,  follow that advice instead.       For most people,  eating healthier means getting less added sugar and less processed foods in your diet    The fresher the better.    Added sugar is now a " part of the nutrition label on manufactured food, so you can keep an eye on it easier.    But basically,  foods and beverages  that contain regular sugar and corn syrup are the main sources of added sugars.  Eating as little of these foods as you can is best.   One shocking example of the epidemic of added sugar is soda.    One can of regular soda contains about as much added sugar as 3 regular size doughnuts!     The other issue with processed foods is the amount of processed grains they contain , such as white flour.    This is also something you want to try to limit in your diet.     But, grain products are very important for your nutrition.    Whole grains are better for your body.     Cutting back on white breads, traditional pasta, baked goods, white rice,  and processed cereals will be healthier for you.   The better choices include whole grain breads,  whole wheat pasta,  brown rice, quinoa, barley, steel cut  or rolled oats.   If you eat cereal for breakfast, try to look for one made with whole grains and less sugar.   There are many people who have a problem with gluten, for a large variety of reasons.    Generally,  products made with wheat flour , barley or rye are the primary source of gluten.       Cutting back on saturated fats is important.    You want the majority of the meat that you eat to be chicken, fish or turkey.   Baked or broiled is best -  fried adds too much fat.    There are healthy fats that are important - fat is important for holding down appetite, vitamin absorption and several metabolic processes in the body.  Monounsaturated fats raise HDL (good cholesterol) and lower LDL (bad cholesterol).   Olive oil, peanut oil, nuts, seeds, and avocados are great sources of the good fats.       Ideas are:   Trade sour cream dip for hummus (which is rich in olive oil) or guacamole; use veggies or whole-wheat chips to dip.    Nuts are an excellent source of protein and healthy fats.   Tree nuts  "are the best kind, such as almonds or walnuts.   Just be careful - they are high in calories, so stick to a serving size.  (Most are about 200 calories for a 1/4 cup)      Proteins are very important for your body, and they also hold down your appetite.   Try to have protein with every meal.    These generally are meats, nuts, many beans, legumes, eggs, and dairy.   You will find protein in whole grain products and some green vegetables have a little too.     When you have dairy (if you can - many people are lactose intolerant) try to make it low fat.    Ideas are 1% milk, lowfat yogurt or cheeses, low fat cottage cheese.   I don't generally recommend FAT FREE because they often contain artificial products to improve taste, and the fat helps hold down your appetite.   If you are lactose intolerant, try to see if taking Lactaid before having dairy helps.      Fresh fruits and vegetables are VERY important.  The brighter the better.   Many vegetables are considered \"Free Foods\" - meaning you can eat as much as you want, and it does not matter.  These include tomatoes, cucumbers, celery, peppers, all the various lettuces and kale - to name a few.   Potatoes, corn and peas are starchy, so do have more calories, but are still healthy - you just want to watch the amount of them you eat.       Fruits are full of wonderful nutrition.   They contain natural sugar called fructose, so eating them in moderation is best.   Diabetics may need to pay careful attention to how their body reacts to the sugar.  Some fruits might drastically increase their blood sugar.      Eating smaller meals with a couple of small snacks is better for your metabolism than not eating for long amounts of time  (breakfast is very important).   Trying to avoid large meals is helpful too.    Eating like this helps keep your appetite down and keeps you in burning metabolism rather than storage metabolism so your body will use the calories you eat.       I " do not tell people to stop eating sweets or snack foods - just limit the amounts you have.  The less the better.   Pay attention to serving sizes, and treat them as a treat.        Foods like doughnuts, pop tarts, sugar cereals, cookies  ARE NOT GOOD FOR BREAKFAST.   They are loaded with sugar and will cause you to be hungrier in the day and often not feel well.    Caffeine needs to be limited - no more than 2 servings a day.  Some people can't have any at all.    (if you have any sleep or anxiety issues - stop the caffeine)   Coffee, many teas, many sodas, energy drinks, almost any diet supplement,  and chocolate all contain caffeine.      Water is important.   For most people, 8   x  8 ounces  a day are needed.  This may vary for some health issues.    If you need to be on a low sodium diet, that means looking at labels and eating only 1000 - 2000 mg of sodium a day.    Calcium intake is important.  3 servings of a high calcium food or drink a day is recommended.   This is usually a cup of milk, a cup of yogurt, an ounce of a hard cheese or 1.5 ounces of a soft cheese are the usual servings.   There are other high calcium foods - including soy or almond milk, broccoli,  almonds, dark green leafy vegetable.   Make sure you are not getting more than 1000  - 1200 mg of total calcium a day (unless you have been told you need more by a doctor).    Vitamin D 3 is important to absorb the calcium and for your immune system.   For children, 400 IU a day is recommended.   For adults - 800 - 5000 IU a day  is recommended.  (Often the amount needed is individualized for adults - be sure to ask how much is right for you)    Physical activity is very important for good health.    Finding activities that give you regular exercise is very important for good health.  Try to find exercise you enjoy doing on a regular basis.    30 minutes at least 5 days a week of a good cardiovascular exercise is recommended.   That means something  that gets your heart rate going faster than your usual baseline and you can find yourself breathing harder than usual while you are exercising.  If you have not done any exercise in a long time,  make sure you ask if its safe for you to start,  and be sure to gradually work up to your goal.      If you need to lose weight,  following these recommendations will help you.   And if you are doing all of this and still not losing weight, then its likely just the amount of food you are eating.   Learn to cut back on portion sizes.  Using smaller plates may help.  Healthy weight loss is  only about a pound a week.   You have to remember that whatever you do to lose the weight, you must be prepared to keep it up for life for the weight to stay off.     A lot of people have a lot of luck with using something like a fit bit,  or a program where you keep track of all of your calories that you eat and what you burn off in the day.

## 2025-06-28 LAB
ALBUMIN SERPL-MCNC: 4.4 G/DL (ref 3.6–5.1)
ALP SERPL-CCNC: 46 U/L (ref 35–144)
ALT SERPL-CCNC: 24 U/L (ref 9–46)
ANION GAP SERPL CALCULATED.4IONS-SCNC: 10 MMOL/L (CALC) (ref 7–17)
AST SERPL-CCNC: 23 U/L (ref 10–35)
BASOPHILS # BLD AUTO: 30 CELLS/UL (ref 0–200)
BASOPHILS NFR BLD AUTO: 0.6 %
BILIRUB SERPL-MCNC: 0.6 MG/DL (ref 0.2–1.2)
BUN SERPL-MCNC: 19 MG/DL (ref 7–25)
CALCIUM SERPL-MCNC: 9.2 MG/DL (ref 8.6–10.3)
CHLORIDE SERPL-SCNC: 104 MMOL/L (ref 98–110)
CHOLEST SERPL-MCNC: 179 MG/DL
CHOLEST/HDLC SERPL: 5.3 (CALC)
CO2 SERPL-SCNC: 25 MMOL/L (ref 20–32)
CREAT SERPL-MCNC: 1.8 MG/DL (ref 0.7–1.35)
EGFRCR SERPLBLD CKD-EPI 2021: 41 ML/MIN/1.73M2
EOSINOPHIL # BLD AUTO: 290 CELLS/UL (ref 15–500)
EOSINOPHIL NFR BLD AUTO: 5.8 %
ERYTHROCYTE [DISTWIDTH] IN BLOOD BY AUTOMATED COUNT: 12.1 % (ref 11–15)
GLUCOSE SERPL-MCNC: 100 MG/DL (ref 65–99)
HCT VFR BLD AUTO: 42.7 % (ref 38.5–50)
HDLC SERPL-MCNC: 34 MG/DL
HGB BLD-MCNC: 13.5 G/DL (ref 13.2–17.1)
LDLC SERPL CALC-MCNC: ABNORMAL MG/DL
LYMPHOCYTES # BLD AUTO: 1705 CELLS/UL (ref 850–3900)
LYMPHOCYTES NFR BLD AUTO: 34.1 %
MCH RBC QN AUTO: 31.9 PG (ref 27–33)
MCHC RBC AUTO-ENTMCNC: 31.6 G/DL (ref 32–36)
MCV RBC AUTO: 100.9 FL (ref 80–100)
MONOCYTES # BLD AUTO: 320 CELLS/UL (ref 200–950)
MONOCYTES NFR BLD AUTO: 6.4 %
NEUTROPHILS # BLD AUTO: 2655 CELLS/UL (ref 1500–7800)
NEUTROPHILS NFR BLD AUTO: 53.1 %
NONHDLC SERPL-MCNC: 145 MG/DL (CALC)
PHOSPHATE SERPL-MCNC: 3.2 MG/DL (ref 2.1–4.3)
PLATELET # BLD AUTO: 219 THOUSAND/UL (ref 140–400)
PMV BLD REES-ECKER: 11.6 FL (ref 7.5–12.5)
POTASSIUM SERPL-SCNC: 4.4 MMOL/L (ref 3.5–5.3)
PROT SERPL-MCNC: 6.4 G/DL (ref 6.1–8.1)
PSA SERPL-MCNC: 0.97 NG/ML
RBC # BLD AUTO: 4.23 MILLION/UL (ref 4.2–5.8)
SODIUM SERPL-SCNC: 139 MMOL/L (ref 135–146)
TRIGL SERPL-MCNC: 428 MG/DL
TSH SERPL-ACNC: 2.5 MIU/L (ref 0.4–4.5)
WBC # BLD AUTO: 5 THOUSAND/UL (ref 3.8–10.8)

## 2025-07-03 DIAGNOSIS — E78.1 HYPERTRIGLYCERIDEMIA: Primary | ICD-10-CM

## 2025-07-30 ENCOUNTER — APPOINTMENT (OUTPATIENT)
Dept: NEPHROLOGY | Facility: CLINIC | Age: 65
End: 2025-07-30

## 2025-07-30 VITALS
BODY MASS INDEX: 33.79 KG/M2 | TEMPERATURE: 98 F | HEART RATE: 62 BPM | WEIGHT: 236 LBS | OXYGEN SATURATION: 94 % | DIASTOLIC BLOOD PRESSURE: 74 MMHG | SYSTOLIC BLOOD PRESSURE: 153 MMHG | HEIGHT: 70 IN

## 2025-07-30 DIAGNOSIS — N28.9 IMPAIRED RENAL FUNCTION: ICD-10-CM

## 2025-07-30 DIAGNOSIS — N06.0 ISOLATED PROTEINURIA WITH MINOR GLOMERULAR ABNORMALITY: ICD-10-CM

## 2025-07-30 DIAGNOSIS — N18.32 CHRONIC RENAL IMPAIRMENT, STAGE 3B (MULTI): Primary | ICD-10-CM

## 2025-07-30 DIAGNOSIS — I10 BENIGN ESSENTIAL HYPERTENSION: ICD-10-CM

## 2025-07-30 PROCEDURE — 3077F SYST BP >= 140 MM HG: CPT | Performed by: INTERNAL MEDICINE

## 2025-07-30 PROCEDURE — 3008F BODY MASS INDEX DOCD: CPT | Performed by: INTERNAL MEDICINE

## 2025-07-30 PROCEDURE — 1159F MED LIST DOCD IN RCRD: CPT | Performed by: INTERNAL MEDICINE

## 2025-07-30 PROCEDURE — 3078F DIAST BP <80 MM HG: CPT | Performed by: INTERNAL MEDICINE

## 2025-07-30 PROCEDURE — 99214 OFFICE O/P EST MOD 30 MIN: CPT | Performed by: INTERNAL MEDICINE

## 2025-07-30 NOTE — PATIENT INSTRUCTIONS
Dear JEAN   It was nice seeing you in the nephrology clinic today     Today we discussed the following:     #Chronic kidney disease stage III-your kidney function  stable 40-45%.  I checked your kidney function with another different marker which is more accurate and that came up as 60%.  Chronic kidney disease is most likely atherosclerosis and long term high blood pressure.  No change in medications today      #Protein leak in the urine-will  monitor     #Hypertension-excellent control.  Continue lisinopril 40 mg, metoprolol  Please follow healthy life style, watch salt in diet, avoid soy sauce and processed meat, limit soda drinks. Exercise regularly. No smoking. Check your blood pressure daily - same time of the day - and write numbers down. Please bring log with you next visit.     # Avoid NSAIDs - this is a class of medications that can harm your kidneys - like Advil, Aleve, Ibuprofen, Motrin, Meloxicam. Tylenol/acetaminophen is okay          #Elevated cholesterol and triglyceride-continue rosuvastatin. Exercise regularly and limit fat intake           Follow-up in 12 months with repeat blood work and urinalysis prior to next visit        Please call our office if you have any question  Thank you for coming to see me today     Jose Antonio Almazan MD, MS, LILLIAN TEE  Clinical  - [facility] [employer]  Nephrologist - [facility] - [facility]

## 2025-07-30 NOTE — PROGRESS NOTES
Subjective       Ian Hubbard is a 64 y.o. male who has past medical history of  HTN, HLD, TRUMAN presents for follow-up management of CKD.    Last seen on 7/31/2024. Patient came along for today's visit. He denies any new concerns. Patient reports feeling well. He has put on about 10 pounds in the last year. Patient has been exercising less lately and has not been watching his diet. Patient is recommended to get Cystatin C prior to his next appointment. BP at office 153/74.  Recent lab work indicates GFR 41, Cr 1.8, and K 4.4. Kidney function is stable. Follow up in 1 year.      Prior notes    Last visit was in June of 2023. Pt came alone to appt today, reports he has been making a big effort to stay more hydrated. He also has been trying hard to eat better and thinks he's doing a good job. He reports his blood pressure at home has remained very well controlled on his current medication regimen. He has not been taking Farxiga for a few months at this point.     He endorses some ankle swelling after work, where he is on his feet all day, but says it resolves quickly when he gets home and sits down. He does not feel short of breath.      Objective   There were no vitals taken for this visit.  Wt Readings from Last 3 Encounters:   06/27/25 105 kg (232 lb)   12/13/24 106 kg (233 lb)   07/31/24 102 kg (224 lb 3.2 oz)       Physical Exam  General appearance: no distress awake and alert on room air, euvolemic on exam  Eyes: non-icteric  HEENT: atrumatic head, PEERLA, moist mucosa  Skin: no apparent rash  Heart: NSR, S1, S2 normal, no murmur or gallop  Lungs: Symmetrical expansion,CTA bilat no wheezing/crackles  Abdomen: soft, nt/nd, obese  Extremities: No edema bilat  Neuro: No FND,asterixis, no focal deficits noticed        Review of systems  Constitutional: no fever, no chills, no recent weight gain and no recent weight loss.   Eyes: no blurred vision and no diplopia.   ENT: no hearing loss, no earache, no sore  "throat, no swollen glands in the neck and no nasal discharge.   Cardiovascular: no chest pain, no palpitations and no lower extremity edema.   Respiratory: no shortness of breath, no chronic cough and no shortness of breath during exertion.   Gastrointestinal: no abdominal pain, no constipation, no heartburn, no vomiting, no bloody stools and no change in bowel movements.   Genitourinary: no dysuria and no hematuria.   Musculoskeletal: no arthralgias and no myalgias.   Skin: no rashes and no skin lesions.   Neurological: no headaches and no dizziness.   Psychiatric: no confusion, no depression and no anxiety.   Endocrine: no heat intolerance, no cold intolerance, appetite not increased, no thyroid disorder, no increased urinary frequency and no dry skin.   Hematologic/Lymphatic: does not bleed easily and does not bruise easily.   All other systems have been reviewed and are negative for complaint.         Data Review                   Lab Results   Component Value Date    URICACID CANCELED 06/25/2023           Lab Results   Component Value Date    HGBA1C CANCELED 06/24/2023                 No lab exists for component: \"CR\", \"PHOSPHORUS\"        Albumin/Creatinine Ratio   Date Value Ref Range Status   07/31/2024 36.3 (H) <30.0 ug/mg Creat Final     Albumin/Creatine Ratio   Date Value Ref Range Status   06/23/2023 33.6 (H) 0.0 - 30.0 ug/mg crt Final   06/23/2023 32.4 (H) 0.0 - 30.0 ug/mg crt Final            RFP  Recent Labs     06/27/25  0852 12/13/24  0834 06/21/24  0918 12/29/23  0942 06/25/23  0057 06/23/23  1055 01/20/23  1804    140 138 138 CANCELED 137 140   K 4.4 4.5 4.3 4.5 CANCELED 4.3 4.4    102 104 103 CANCELED 100 103   CO2 25 28 24 24 CANCELED 27 26   BUN 19 21 20 25* CANCELED 24* 26*   CREATININE 1.80* 1.75* 1.71* 1.67* CANCELED 1.79* 1.97*   GLUCOSE 100* 93 97 98 CANCELED 92 101*   CALCIUM 9.2 9.1 9.4 9.4 CANCELED 9.2 9.4   PHOS 3.2  --   --  3.6 CANCELED 3.9  --    EGFR 41* 43* 44* 45*  --  "  --   --    ANIONGAP 10 15 14 16 CANCELED 14 15        Urineanalysis  Recent Labs     06/23/23  1145 04/18/23  1300   COLORU CANCELED  YELLOW YELLOW   APPEARANCEU CANCELED  CLEAR CLEAR   SPECGRAVU CANCELED  1.013 1.021   JESUS MANUEL CANCELED  6.0 5.0   PROTUR CANCELED  NEGATIVE 101*  30(1+)*   GLUCOSEU CANCELED  >=500 (3+)* NEGATIVE   BLOODU CANCELED  NEGATIVE SMALL(1+)*   KETONESU CANCELED  NEGATIVE NEGATIVE   BILIRUBINU CANCELED  NEGATIVE NEGATIVE   NITRITEU CANCELED  NEGATIVE NEGATIVE   LEUKOCYTESU CANCELED  NEGATIVE NEGATIVE       Urine Electrolytes  Recent Labs     07/31/24  1644 06/23/23  1145 04/18/23  1300   SODIUMURR  --   --  56   NACREATUR  --   --  29   KUR  --   --  106   KCREATUR  --   --  54   CREATU 178.0 63.1  62.6 196.0  196.0  196.0   PROTUR  --  CANCELED  NEGATIVE 101*  30(1+)*   UTPCR  --   --  0.52*   ALBUMINUR 64.7  --   --    MICROALBCREA 36.3* 33.6*  32.4* 50.6*        Urine Micro  Recent Labs     04/18/23  1300   WBCU 1   RBCU NONE   SQUAMEPIU <1        Iron  Recent Labs     06/25/23  0057 06/23/23  1055   IRON CANCELED 79   TIBC CANCELED 313   IRONSAT CANCELED 25   FERRITIN CANCELED 210          Current Outpatient Medications on File Prior to Visit   Medication Sig Dispense Refill    albuterol 90 mcg/actuation inhaler Inhale 2 puffs every 4 hours if needed for wheezing or shortness of breath. 18 g 2    albuterol-budesonide (Airsupra) 90-80 mcg/actuation inhaler Inhale 2 puffs every 6 hours if needed (cough or shortness of breath). 10.7 g 2    fish oil (Omega-3) 60- mg capsule Take 2 capsules (1,000 mg) by mouth 2 times a day. 120 capsule 11    lisinopril 40 mg tablet Take 1 tablet (40 mg) by mouth once daily. 90 tablet 1    metoprolol succinate XL (Toprol-XL) 100 mg 24 hr tablet Take 1 tablet (100 mg) by mouth once daily. 90 tablet 1    rosuvastatin (Crestor) 20 mg tablet Take 1 tablet (20 mg) by mouth once daily. 90 tablet 1     No current facility-administered medications  on file prior to visit.           Assessment and Plan       Ian Hubbard  is a 64 y.o. male who has past medical history of  HTN, HLD, TRUMAN presents for follow-up management of CKD.      # Chronic kidney disease - 2  - Baseline SCr was worsening, up to 1.90  - Most recent Scr 1.8 on 6/27/25  - However cystatin C was 1.21, eGFR based on this is 60 in 2023  - Most likely related to atherosclerosis  - Initial urine dipstick negative for protein, blood, LE  - Prior kidney US showed left renal cyst, no evidence of stone/obstruction  - Lytes : Na 137, K 4.4, Cl 104 on 6/27/25  - HCZ stopped in a prior visit   - Will not restart Farxiga today, no need  - Alb/cr ratio 36.3 on 7/31/24.  - No changes at this time      # BP - today at office 153/74 orthostatic vitals   - Within target  - Current meds: Lisinopril 40 mg daily, metoprolol 100 mg daily  - No Changes       #No significant anemia  - Hgb 13.5 on 6/27/25    #(CKD)-MBD  - Ca 9.2, Phos 3.9    # CV[facility]  - On statins 20 mg daily      #Others  - No NSAIDs, no contrast as possible. If to be done- we recommend holding ACEi/ARBS/diuretics 24 hrs prior to contrast exposure and ensure appropriate hydration   - Ensure well hydrated  - Limit salt in diet  - No smoking    Follow-up in 1 year w/ repeat labs    Negar Smith,   Internal Medicine - PGY 1

## 2025-07-31 ENCOUNTER — APPOINTMENT (OUTPATIENT)
Dept: NEPHROLOGY | Facility: CLINIC | Age: 65
End: 2025-07-31

## 2026-01-09 ENCOUNTER — APPOINTMENT (OUTPATIENT)
Dept: PRIMARY CARE | Facility: CLINIC | Age: 66
End: 2026-01-09
Payer: COMMERCIAL

## 2026-07-27 ENCOUNTER — APPOINTMENT (OUTPATIENT)
Dept: NEPHROLOGY | Facility: CLINIC | Age: 66
End: 2026-07-27
Payer: COMMERCIAL